# Patient Record
Sex: FEMALE | Employment: UNEMPLOYED | URBAN - METROPOLITAN AREA
[De-identification: names, ages, dates, MRNs, and addresses within clinical notes are randomized per-mention and may not be internally consistent; named-entity substitution may affect disease eponyms.]

---

## 2021-07-24 ENCOUNTER — APPOINTMENT (OUTPATIENT)
Dept: GENERAL RADIOLOGY | Facility: CLINIC | Age: 61
End: 2021-07-24
Attending: INTERNAL MEDICINE
Payer: MEDICAID

## 2021-07-24 ENCOUNTER — HOSPITAL ENCOUNTER (INPATIENT)
Facility: CLINIC | Age: 61
LOS: 2 days | End: 2021-07-26
Attending: INTERNAL MEDICINE | Admitting: INTERNAL MEDICINE
Payer: MEDICAID

## 2021-07-24 ENCOUNTER — APPOINTMENT (OUTPATIENT)
Dept: CT IMAGING | Facility: CLINIC | Age: 61
End: 2021-07-24
Attending: INTERNAL MEDICINE
Payer: MEDICAID

## 2021-07-24 DIAGNOSIS — I46.9 CARDIAC ARREST (H): Primary | ICD-10-CM

## 2021-07-24 LAB
ACT BLD: 154 SECONDS (ref 74–150)
ACT BLD: 163 SECONDS (ref 74–150)
ACT BLD: 171 SECONDS (ref 74–150)
ACT BLD: 171 SECONDS (ref 74–150)
ACT BLD: 175 SECONDS (ref 74–150)
ACT BLD: 324 SECONDS (ref 74–150)
ALBUMIN SERPL-MCNC: 2.2 G/DL (ref 3.4–5)
ALBUMIN UR-MCNC: 100 MG/DL
ALP SERPL-CCNC: 131 U/L (ref 40–150)
ALT SERPL W P-5'-P-CCNC: 101 U/L (ref 0–50)
AMPHETAMINES UR QL SCN: ABNORMAL
ANION GAP SERPL CALCULATED.3IONS-SCNC: 14 MMOL/L (ref 3–14)
ANION GAP SERPL CALCULATED.3IONS-SCNC: 17 MMOL/L (ref 3–14)
ANTIBODY SCREEN: NEGATIVE
ANTIBODY SCREEN: NEGATIVE
APPEARANCE UR: CLEAR
APTT PPP: 171 SECONDS (ref 22–38)
APTT PPP: >240 SECONDS (ref 22–38)
AST SERPL W P-5'-P-CCNC: 313 U/L (ref 0–45)
BARBITURATES UR QL: ABNORMAL
BASE EXCESS BLDA CALC-SCNC: -12.1 MMOL/L (ref -9.6–2)
BASE EXCESS BLDA CALC-SCNC: -12.7 MMOL/L (ref -9–1.8)
BASE EXCESS BLDA CALC-SCNC: -13.1 MMOL/L (ref -9–1.8)
BASE EXCESS BLDA CALC-SCNC: -13.5 MMOL/L (ref -9–1.8)
BASE EXCESS BLDA CALC-SCNC: -14 MMOL/L (ref -9–1.8)
BASE EXCESS BLDA CALC-SCNC: -14.3 MMOL/L (ref -9–1.8)
BASE EXCESS BLDA CALC-SCNC: -18.1 MMOL/L (ref -9.6–2)
BASE EXCESS BLDA CALC-SCNC: -19.5 MMOL/L (ref -9.6–2)
BASE EXCESS BLDA CALC-SCNC: -8.1 MMOL/L (ref -9.6–2)
BASE EXCESS BLDV CALC-SCNC: -11.7 MMOL/L (ref -7.7–1.9)
BASOPHILS # BLD AUTO: 0.1 10E3/UL (ref 0–0.2)
BASOPHILS NFR BLD AUTO: 0 %
BENZODIAZ UR QL: ABNORMAL
BILIRUB SERPL-MCNC: 0.4 MG/DL (ref 0.2–1.3)
BILIRUB UR QL STRIP: NEGATIVE
BLD PROD TYP BPU: NORMAL
BLOOD COMPONENT TYPE: NORMAL
BUN SERPL-MCNC: 28 MG/DL (ref 7–30)
BUN SERPL-MCNC: 28 MG/DL (ref 7–30)
CA-I BLD-MCNC: 3.6 MG/DL (ref 4.4–5.2)
CA-I BLD-MCNC: 3.7 MG/DL (ref 4.4–5.2)
CA-I BLD-MCNC: 3.9 MG/DL (ref 4.4–5.2)
CA-I BLD-MCNC: 4.1 MG/DL (ref 4.4–5.2)
CA-I BLD-MCNC: 4.2 MG/DL (ref 4.4–5.2)
CA-I BLD-MCNC: 4.6 MG/DL (ref 4.4–5.2)
CA-I BLD-MCNC: 4.6 MG/DL (ref 4.4–5.2)
CALCIUM SERPL-MCNC: 6 MG/DL (ref 8.5–10.1)
CALCIUM SERPL-MCNC: 7 MG/DL (ref 8.5–10.1)
CANNABINOIDS UR QL SCN: ABNORMAL
CF REDUC 30M P MA P HEP LENFR BLD TEG: 0 % (ref 0–8)
CF REDUC 60M P MA P HEPASE LENFR BLD TEG: 0.5 % (ref 0–15)
CFT P HPASE BLD TEG: 1.5 MINUTE (ref 1–3)
CHLORIDE BLD-SCNC: 116 MMOL/L (ref 94–109)
CHLORIDE BLD-SCNC: 118 MMOL/L (ref 94–109)
CI (COAGULATION INDEX)(Z): -0.1 (ref -3–3)
CLOT ANGLE P HPASE BLD TEG: 67 DEGREES (ref 53–72)
CLOT INIT P HPASE BLD TEG: 6.2 MINUTE (ref 5–10)
CLOT STRENGTH P HPASE BLD TEG: 7.2 KD/SC (ref 4.5–11)
CO2 SERPL-SCNC: 15 MMOL/L (ref 20–32)
CO2 SERPL-SCNC: 16 MMOL/L (ref 20–32)
COCAINE UR QL: ABNORMAL
CODING SYSTEM: NORMAL
COLOR UR AUTO: ABNORMAL
CORTIS SERPL-MCNC: 21.6 UG/DL (ref 4–22)
CREAT SERPL-MCNC: 1.03 MG/DL (ref 0.52–1.04)
CREAT SERPL-MCNC: 1.17 MG/DL (ref 0.52–1.04)
CRP SERPL-MCNC: <2.9 MG/L (ref 0–8)
D DIMER PPP FEU-MCNC: 15.67 UG/ML FEU (ref 0–0.5)
EOSINOPHIL # BLD AUTO: 0.1 10E3/UL (ref 0–0.7)
EOSINOPHIL NFR BLD AUTO: 0 %
ERYTHROCYTE [DISTWIDTH] IN BLOOD BY AUTOMATED COUNT: 17.2 % (ref 10–15)
ERYTHROCYTE [DISTWIDTH] IN BLOOD BY AUTOMATED COUNT: 20 % (ref 10–15)
ERYTHROCYTE [DISTWIDTH] IN BLOOD BY AUTOMATED COUNT: 20 % (ref 10–15)
ERYTHROCYTE [SEDIMENTATION RATE] IN BLOOD BY WESTERGREN METHOD: 8 MM/HR (ref 0–30)
ETHANOL UR QL SCN: ABNORMAL
FIBRINOGEN PPP-MCNC: 138 MG/DL (ref 170–490)
GFR SERPL CREATININE-BSD FRML MDRD: 33 ML/MIN/1.73M2
GFR SERPL CREATININE-BSD FRML MDRD: 58 ML/MIN/1.73M2
GLUCOSE BLD-MCNC: 109 MG/DL (ref 70–99)
GLUCOSE BLD-MCNC: 111 MG/DL (ref 70–99)
GLUCOSE BLD-MCNC: 171 MG/DL (ref 70–99)
GLUCOSE BLD-MCNC: 175 MG/DL (ref 70–99)
GLUCOSE BLD-MCNC: 256 MG/DL (ref 70–99)
GLUCOSE BLD-MCNC: 287 MG/DL (ref 70–99)
GLUCOSE BLD-MCNC: 339 MG/DL (ref 70–99)
GLUCOSE BLD-MCNC: 400 MG/DL (ref 70–99)
GLUCOSE BLDC GLUCOMTR-MCNC: 120 MG/DL (ref 70–99)
GLUCOSE BLDC GLUCOMTR-MCNC: 158 MG/DL (ref 70–99)
GLUCOSE UR STRIP-MCNC: 100 MG/DL
HCO3 BLD-SCNC: 13 MMOL/L (ref 21–28)
HCO3 BLD-SCNC: 13 MMOL/L (ref 21–28)
HCO3 BLD-SCNC: 14 MMOL/L (ref 21–28)
HCO3 BLD-SCNC: 14 MMOL/L (ref 21–28)
HCO3 BLDA-SCNC: 10 MMOL/L (ref 21–28)
HCO3 BLDA-SCNC: 14 MMOL/L (ref 21–28)
HCO3 BLDA-SCNC: 14 MMOL/L (ref 21–28)
HCO3 BLDA-SCNC: 15 MMOL/L (ref 21–28)
HCO3 BLDA-SCNC: 18 MMOL/L (ref 21–28)
HCO3 BLDV-SCNC: 17 MMOL/L (ref 21–28)
HCT VFR BLD AUTO: 20.9 % (ref 35–47)
HCT VFR BLD AUTO: 24.9 % (ref 35–47)
HCT VFR BLD AUTO: 24.9 % (ref 35–47)
HGB BLD-MCNC: 6.1 G/DL (ref 13.3–17.7)
HGB BLD-MCNC: 6.5 G/DL (ref 11.7–15.7)
HGB BLD-MCNC: 6.8 G/DL (ref 13.3–17.7)
HGB BLD-MCNC: 7.3 G/DL (ref 11.7–15.7)
HGB BLD-MCNC: 7.3 G/DL (ref 11.7–15.7)
HGB BLD-MCNC: 7.7 G/DL (ref 13.3–17.7)
HGB BLD-MCNC: 8 G/DL (ref 13.3–17.7)
HGB FREE PLAS-MCNC: 40 MG/DL
HGB UR QL STRIP: ABNORMAL
HOLD SPECIMEN: NORMAL
HYALINE CASTS: 3 /LPF
IMM GRANULOCYTES # BLD: 0.5 10E3/UL
IMM GRANULOCYTES NFR BLD: 2 %
INR PPP: 1.66 (ref 0.85–1.15)
INR PPP: 3.99 (ref 0.85–1.15)
ISSUE DATE AND TIME: NORMAL
KETONES UR STRIP-MCNC: NEGATIVE MG/DL
LACTATE BLD-SCNC: 10.3 MMOL/L
LACTATE BLD-SCNC: 10.9 MMOL/L
LACTATE BLD-SCNC: 11.7 MMOL/L
LACTATE BLD-SCNC: 9.9 MMOL/L
LACTATE SERPL-SCNC: 10.4 MMOL/L (ref 0.7–2)
LACTATE SERPL-SCNC: 12.4 MMOL/L (ref 0.7–2)
LACTATE SERPL-SCNC: 12.7 MMOL/L (ref 0.7–2)
LACTATE SERPL-SCNC: 7.9 MMOL/L (ref 0.7–2)
LDH SERPL L TO P-CCNC: 534 U/L (ref 81–234)
LEUKOCYTE ESTERASE UR QL STRIP: NEGATIVE
LYMPHOCYTES # BLD AUTO: 3.2 10E3/UL (ref 0.8–5.3)
LYMPHOCYTES NFR BLD AUTO: 12 %
MAGNESIUM SERPL-MCNC: 1.5 MG/DL (ref 1.6–2.3)
MAGNESIUM SERPL-MCNC: 1.6 MG/DL (ref 1.6–2.3)
MAGNESIUM SERPL-MCNC: 2.1 MG/DL (ref 1.6–2.3)
MCF P HPASE BLD TEG: 58.9 MM (ref 50–70)
MCH RBC QN AUTO: 22.5 PG (ref 26.5–33)
MCH RBC QN AUTO: 22.5 PG (ref 26.5–33)
MCH RBC QN AUTO: 26.7 PG (ref 26.5–33)
MCHC RBC AUTO-ENTMCNC: 29.3 G/DL (ref 31.5–36.5)
MCHC RBC AUTO-ENTMCNC: 29.3 G/DL (ref 31.5–36.5)
MCHC RBC AUTO-ENTMCNC: 31.1 G/DL (ref 31.5–36.5)
MCV RBC AUTO: 77 FL (ref 78–100)
MCV RBC AUTO: 77 FL (ref 78–100)
MCV RBC AUTO: 86 FL (ref 78–100)
MONOCYTES # BLD AUTO: 0.4 10E3/UL (ref 0–1.3)
MONOCYTES NFR BLD AUTO: 2 %
MUCOUS THREADS #/AREA URNS LPF: PRESENT /LPF
NEUTROPHILS # BLD AUTO: 22.5 10E3/UL (ref 1.6–8.3)
NEUTROPHILS NFR BLD AUTO: 84 %
NITRATE UR QL: NEGATIVE
NRBC # BLD AUTO: 0 10E3/UL
NRBC BLD AUTO-RTO: 0 /100
O2/TOTAL GAS SETTING VFR VENT: 100 %
O2/TOTAL GAS SETTING VFR VENT: 100 %
O2/TOTAL GAS SETTING VFR VENT: 40 %
O2/TOTAL GAS SETTING VFR VENT: 60 %
OPIATES UR QL SCN: ABNORMAL
OXYHGB MFR BLD: 96 % (ref 92–100)
OXYHGB MFR BLD: 97 % (ref 92–100)
OXYHGB MFR BLD: 98 % (ref 92–100)
OXYHGB MFR BLD: 98 % (ref 92–100)
OXYHGB MFR BLDA: 90 % (ref 92–100)
OXYHGB MFR BLDA: 96 % (ref 92–100)
OXYHGB MFR BLDA: 97 % (ref 92–100)
OXYHGB MFR BLDA: 98 % (ref 75–100)
OXYHGB MFR BLDA: 98 % (ref 92–100)
OXYHGB MFR BLDV: 57 %
PCO2 BLD: 32 MM HG (ref 35–45)
PCO2 BLD: 35 MM HG (ref 35–45)
PCO2 BLD: 39 MM HG (ref 35–45)
PCO2 BLD: 40 MM HG (ref 35–45)
PCO2 BLDA: 33 MM HG (ref 35–45)
PCO2 BLDA: 37 MM HG (ref 35–45)
PCO2 BLDA: 37 MM HG (ref 35–45)
PCO2 BLDA: 38 MM HG (ref 35–45)
PCO2 BLDA: 76 MM HG (ref 35–45)
PCO2 BLDV: 49 MM HG (ref 40–50)
PH BLD: 7.14 [PH] (ref 7.35–7.45)
PH BLD: 7.17 [PH] (ref 7.35–7.45)
PH BLD: 7.19 [PH] (ref 7.35–7.45)
PH BLD: 7.22 [PH] (ref 7.35–7.45)
PH BLDA: 6.88 [PH] (ref 7.35–7.45)
PH BLDA: 7.03 [PH] (ref 7.35–7.45)
PH BLDA: 7.19 [PH] (ref 7.35–7.45)
PH BLDA: 7.24 [PH] (ref 7.35–7.45)
PH BLDA: 7.29 [PH] (ref 7.35–7.45)
PH BLDV: 7.13 [PH] (ref 7.32–7.43)
PH UR STRIP: 6.5 [PH] (ref 5–7)
PLATELET # BLD AUTO: 203 10E3/UL (ref 150–450)
PLATELET # BLD AUTO: 203 10E3/UL (ref 150–450)
PLATELET # BLD AUTO: 60 10E3/UL (ref 150–450)
PO2 BLD: 144 MM HG (ref 80–105)
PO2 BLD: 150 MM HG (ref 80–105)
PO2 BLD: 478 MM HG (ref 80–105)
PO2 BLD: 95 MM HG (ref 80–105)
PO2 BLDA: 102 MM HG (ref 80–105)
PO2 BLDA: 203 MM HG (ref 80–105)
PO2 BLDA: 221 MM HG (ref 80–105)
PO2 BLDA: 380 MM HG (ref 80–105)
PO2 BLDA: 526 MM HG (ref 80–105)
PO2 BLDV: 39 MM HG (ref 25–47)
POTASSIUM BLD-SCNC: 3.6 MMOL/L (ref 3.4–5.3)
POTASSIUM BLD-SCNC: 4.2 MMOL/L (ref 3.4–5.3)
POTASSIUM BLD-SCNC: 4.2 MMOL/L (ref 3.4–5.3)
POTASSIUM BLD-SCNC: 4.2 MMOL/L (ref 3.5–5)
POTASSIUM BLD-SCNC: 4.3 MMOL/L (ref 3.5–5)
POTASSIUM BLD-SCNC: 4.7 MMOL/L (ref 3.5–5)
POTASSIUM BLD-SCNC: 4.9 MMOL/L (ref 3.5–5)
PROCALCITONIN SERPL-MCNC: 0.14 NG/ML
PROT SERPL-MCNC: 4 G/DL (ref 6.8–8.8)
RBC # BLD AUTO: 2.43 10E6/UL (ref 3.8–5.2)
RBC # BLD AUTO: 3.24 10E6/UL (ref 3.8–5.2)
RBC # BLD AUTO: 3.24 10E6/UL (ref 3.8–5.2)
RBC URINE: 44 /HPF
SARS-COV-2 RNA RESP QL NAA+PROBE: NEGATIVE
SODIUM BLD-SCNC: 138 MMOL/L (ref 133–144)
SODIUM BLD-SCNC: 139 MMOL/L (ref 133–144)
SODIUM SERPL-SCNC: 146 MMOL/L (ref 133–144)
SODIUM SERPL-SCNC: 150 MMOL/L (ref 133–144)
SP GR UR STRIP: 1.02 (ref 1–1.03)
SPECIMEN EXPIRATION DATE: NORMAL
SPECIMEN EXPIRATION DATE: NORMAL
SQUAMOUS EPITHELIAL: <1 /HPF
TROPONIN I SERPL-MCNC: 13.68 UG/L (ref 0–0.04)
UFH PPP CHRO-ACNC: <0.1 IU/ML
UFH PPP CHRO-ACNC: >1.1 IU/ML
UNIT ABO/RH: NORMAL
UNIT NUMBER: NORMAL
UNIT STATUS: NORMAL
UNIT TYPE ISBT: 8400
UNIT TYPE ISBT: 9500
UROBILINOGEN UR STRIP-MCNC: NORMAL MG/DL
WBC # BLD AUTO: 13.5 10E3/UL (ref 4–11)
WBC # BLD AUTO: 26.8 10E3/UL (ref 4–11)
WBC # BLD AUTO: 26.8 10E3/UL (ref 4–11)
WBC URINE: 0 /HPF

## 2021-07-24 PROCEDURE — 3E043XZ INTRODUCTION OF VASOPRESSOR INTO CENTRAL VEIN, PERCUTANEOUS APPROACH: ICD-10-PCS | Performed by: INTERNAL MEDICINE

## 2021-07-24 PROCEDURE — 250N000011 HC RX IP 250 OP 636: Performed by: INTERNAL MEDICINE

## 2021-07-24 PROCEDURE — 80365 DRUG SCREENING OXYCODONE: CPT | Performed by: INTERNAL MEDICINE

## 2021-07-24 PROCEDURE — C1725 CATH, TRANSLUMIN NON-LASER: HCPCS | Performed by: INTERNAL MEDICINE

## 2021-07-24 PROCEDURE — 85396 CLOTTING ASSAY WHOLE BLOOD: CPT | Performed by: INTERNAL MEDICINE

## 2021-07-24 PROCEDURE — C1874 STENT, COATED/COV W/DEL SYS: HCPCS | Performed by: INTERNAL MEDICINE

## 2021-07-24 PROCEDURE — 36556 INSERT NON-TUNNEL CV CATH: CPT | Performed by: INTERNAL MEDICINE

## 2021-07-24 PROCEDURE — 82803 BLOOD GASES ANY COMBINATION: CPT

## 2021-07-24 PROCEDURE — 5A1522F EXTRACORPOREAL OXYGENATION, MEMBRANE, CENTRAL: ICD-10-PCS | Performed by: INTERNAL MEDICINE

## 2021-07-24 PROCEDURE — 272N000001 HC OR GENERAL SUPPLY STERILE: Performed by: INTERNAL MEDICINE

## 2021-07-24 PROCEDURE — 83735 ASSAY OF MAGNESIUM: CPT | Performed by: INTERNAL MEDICINE

## 2021-07-24 PROCEDURE — 250N000009 HC RX 250: Performed by: INTERNAL MEDICINE

## 2021-07-24 PROCEDURE — 85610 PROTHROMBIN TIME: CPT | Performed by: INTERNAL MEDICINE

## 2021-07-24 PROCEDURE — 99153 MOD SED SAME PHYS/QHP EA: CPT | Performed by: INTERNAL MEDICINE

## 2021-07-24 PROCEDURE — 200N000002 HC R&B ICU UMMC

## 2021-07-24 PROCEDURE — B2111ZZ FLUOROSCOPY OF MULTIPLE CORONARY ARTERIES USING LOW OSMOLAR CONTRAST: ICD-10-PCS | Performed by: INTERNAL MEDICINE

## 2021-07-24 PROCEDURE — 82330 ASSAY OF CALCIUM: CPT

## 2021-07-24 PROCEDURE — 86920 COMPATIBILITY TEST SPIN: CPT | Performed by: INTERNAL MEDICINE

## 2021-07-24 PROCEDURE — 85347 COAGULATION TIME ACTIVATED: CPT

## 2021-07-24 PROCEDURE — 84132 ASSAY OF SERUM POTASSIUM: CPT | Performed by: INTERNAL MEDICINE

## 2021-07-24 PROCEDURE — 80307 DRUG TEST PRSMV CHEM ANLYZR: CPT | Performed by: INTERNAL MEDICINE

## 2021-07-24 PROCEDURE — 71250 CT THORAX DX C-: CPT

## 2021-07-24 PROCEDURE — P9016 RBC LEUKOCYTES REDUCED: HCPCS

## 2021-07-24 PROCEDURE — 82805 BLOOD GASES W/O2 SATURATION: CPT | Performed by: INTERNAL MEDICINE

## 2021-07-24 PROCEDURE — 85384 FIBRINOGEN ACTIVITY: CPT | Performed by: INTERNAL MEDICINE

## 2021-07-24 PROCEDURE — 84295 ASSAY OF SERUM SODIUM: CPT | Performed by: INTERNAL MEDICINE

## 2021-07-24 PROCEDURE — 85520 HEPARIN ASSAY: CPT | Performed by: INTERNAL MEDICINE

## 2021-07-24 PROCEDURE — 999N000127 HC STATISTIC PERIPHERAL IV START W US GUIDANCE

## 2021-07-24 PROCEDURE — 33947 ECMO/ECLS INITIATION ARTERY: CPT

## 2021-07-24 PROCEDURE — 86900 BLOOD TYPING SEROLOGIC ABO: CPT | Performed by: INTERNAL MEDICINE

## 2021-07-24 PROCEDURE — 82803 BLOOD GASES ANY COMBINATION: CPT | Performed by: INTERNAL MEDICINE

## 2021-07-24 PROCEDURE — 83605 ASSAY OF LACTIC ACID: CPT | Performed by: INTERNAL MEDICINE

## 2021-07-24 PROCEDURE — C1751 CATH, INF, PER/CENT/MIDLINE: HCPCS | Performed by: INTERNAL MEDICINE

## 2021-07-24 PROCEDURE — 83051 HEMOGLOBIN PLASMA: CPT | Performed by: INTERNAL MEDICINE

## 2021-07-24 PROCEDURE — 84155 ASSAY OF PROTEIN SERUM: CPT | Performed by: INTERNAL MEDICINE

## 2021-07-24 PROCEDURE — 250N000013 HC RX MED GY IP 250 OP 250 PS 637: Performed by: INTERNAL MEDICINE

## 2021-07-24 PROCEDURE — C1769 GUIDE WIRE: HCPCS | Performed by: INTERNAL MEDICINE

## 2021-07-24 PROCEDURE — 81001 URINALYSIS AUTO W/SCOPE: CPT | Performed by: INTERNAL MEDICINE

## 2021-07-24 PROCEDURE — 6A4Z0ZZ HYPOTHERMIA, SINGLE: ICD-10-PCS | Performed by: INTERNAL MEDICINE

## 2021-07-24 PROCEDURE — 94002 VENT MGMT INPAT INIT DAY: CPT

## 2021-07-24 PROCEDURE — 5A1945Z RESPIRATORY VENTILATION, 24-96 CONSECUTIVE HOURS: ICD-10-PCS | Performed by: INTERNAL MEDICINE

## 2021-07-24 PROCEDURE — 999N000157 HC STATISTIC RCP TIME EA 10 MIN

## 2021-07-24 PROCEDURE — 71045 X-RAY EXAM CHEST 1 VIEW: CPT | Mod: 26 | Performed by: RADIOLOGY

## 2021-07-24 PROCEDURE — 70450 CT HEAD/BRAIN W/O DYE: CPT | Mod: 26 | Performed by: RADIOLOGY

## 2021-07-24 PROCEDURE — 86140 C-REACTIVE PROTEIN: CPT | Performed by: INTERNAL MEDICINE

## 2021-07-24 PROCEDURE — 86316 IMMUNOASSAY TUMOR OTHER: CPT | Performed by: INTERNAL MEDICINE

## 2021-07-24 PROCEDURE — 99291 CRITICAL CARE FIRST HOUR: CPT | Mod: 25 | Performed by: INTERNAL MEDICINE

## 2021-07-24 PROCEDURE — 85379 FIBRIN DEGRADATION QUANT: CPT | Performed by: INTERNAL MEDICINE

## 2021-07-24 PROCEDURE — 272N000555 HC SENSOR NIRS OXIMETER, ADULT

## 2021-07-24 PROCEDURE — 84450 TRANSFERASE (AST) (SGOT): CPT | Performed by: INTERNAL MEDICINE

## 2021-07-24 PROCEDURE — 027034Z DILATION OF CORONARY ARTERY, ONE ARTERY WITH DRUG-ELUTING INTRALUMINAL DEVICE, PERCUTANEOUS APPROACH: ICD-10-PCS | Performed by: INTERNAL MEDICINE

## 2021-07-24 PROCEDURE — C1887 CATHETER, GUIDING: HCPCS | Performed by: INTERNAL MEDICINE

## 2021-07-24 PROCEDURE — 85025 COMPLETE CBC W/AUTO DIFF WBC: CPT | Performed by: INTERNAL MEDICINE

## 2021-07-24 PROCEDURE — 82330 ASSAY OF CALCIUM: CPT | Performed by: INTERNAL MEDICINE

## 2021-07-24 PROCEDURE — 272N000237 HC CARDIOHELP CIRCUIT

## 2021-07-24 PROCEDURE — 80361 OPIATES 1 OR MORE: CPT | Performed by: INTERNAL MEDICINE

## 2021-07-24 PROCEDURE — 250N000009 HC RX 250

## 2021-07-24 PROCEDURE — 71045 X-RAY EXAM CHEST 1 VIEW: CPT

## 2021-07-24 PROCEDURE — P9059 PLASMA, FRZ BETWEEN 8-24HOUR: HCPCS

## 2021-07-24 PROCEDURE — 82947 ASSAY GLUCOSE BLOOD QUANT: CPT | Performed by: INTERNAL MEDICINE

## 2021-07-24 PROCEDURE — 258N000003 HC RX IP 258 OP 636: Performed by: INTERNAL MEDICINE

## 2021-07-24 PROCEDURE — C9606 PERC D-E COR REVASC W AMI S: HCPCS | Performed by: INTERNAL MEDICINE

## 2021-07-24 PROCEDURE — C9113 INJ PANTOPRAZOLE SODIUM, VIA: HCPCS | Performed by: INTERNAL MEDICINE

## 2021-07-24 PROCEDURE — 99152 MOD SED SAME PHYS/QHP 5/>YRS: CPT | Performed by: INTERNAL MEDICINE

## 2021-07-24 PROCEDURE — U0003 INFECTIOUS AGENT DETECTION BY NUCLEIC ACID (DNA OR RNA); SEVERE ACUTE RESPIRATORY SYNDROME CORONAVIRUS 2 (SARS-COV-2) (CORONAVIRUS DISEASE [COVID-19]), AMPLIFIED PROBE TECHNIQUE, MAKING USE OF HIGH THROUGHPUT TECHNOLOGIES AS DESCRIBED BY CMS-2020-01-R: HCPCS | Performed by: INTERNAL MEDICINE

## 2021-07-24 PROCEDURE — 74176 CT ABD & PELVIS W/O CONTRAST: CPT | Mod: 26 | Performed by: RADIOLOGY

## 2021-07-24 PROCEDURE — 82533 TOTAL CORTISOL: CPT | Performed by: INTERNAL MEDICINE

## 2021-07-24 PROCEDURE — 70450 CT HEAD/BRAIN W/O DYE: CPT

## 2021-07-24 PROCEDURE — 85027 COMPLETE CBC AUTOMATED: CPT | Performed by: INTERNAL MEDICINE

## 2021-07-24 PROCEDURE — 999N000185 HC STATISTIC TRANSPORT TIME EA 15 MIN

## 2021-07-24 PROCEDURE — 83615 LACTATE (LD) (LDH) ENZYME: CPT | Performed by: INTERNAL MEDICINE

## 2021-07-24 PROCEDURE — 85652 RBC SED RATE AUTOMATED: CPT | Performed by: INTERNAL MEDICINE

## 2021-07-24 PROCEDURE — 272N000203 HC ARTERIAL CANNULA 15-17 FRENCH: Performed by: INTERNAL MEDICINE

## 2021-07-24 PROCEDURE — 80359 METHYLENEDIOXYAMPHETAMINES: CPT | Performed by: INTERNAL MEDICINE

## 2021-07-24 PROCEDURE — 80347 BENZODIAZEPINES 13 OR MORE: CPT | Performed by: INTERNAL MEDICINE

## 2021-07-24 PROCEDURE — 33952 ECMO/ECLS INSJ PRPH CANNULA: CPT | Performed by: INTERNAL MEDICINE

## 2021-07-24 PROCEDURE — 84484 ASSAY OF TROPONIN QUANT: CPT | Performed by: INTERNAL MEDICINE

## 2021-07-24 PROCEDURE — P9041 ALBUMIN (HUMAN),5%, 50ML: HCPCS | Performed by: INTERNAL MEDICINE

## 2021-07-24 PROCEDURE — C9460 INJECTION, CANGRELOR: HCPCS | Performed by: INTERNAL MEDICINE

## 2021-07-24 PROCEDURE — 272N000002 HC OR SUPPLY OTHER OPNP: Performed by: INTERNAL MEDICINE

## 2021-07-24 PROCEDURE — C1894 INTRO/SHEATH, NON-LASER: HCPCS | Performed by: INTERNAL MEDICINE

## 2021-07-24 PROCEDURE — 71250 CT THORAX DX C-: CPT | Mod: 26 | Performed by: RADIOLOGY

## 2021-07-24 PROCEDURE — 85730 THROMBOPLASTIN TIME PARTIAL: CPT | Performed by: INTERNAL MEDICINE

## 2021-07-24 PROCEDURE — C1757 CATH, THROMBECTOMY/EMBOLECT: HCPCS | Performed by: INTERNAL MEDICINE

## 2021-07-24 PROCEDURE — 82810 BLOOD GASES O2 SAT ONLY: CPT

## 2021-07-24 PROCEDURE — 999N000065 XR CHEST PORT 1 VIEW

## 2021-07-24 PROCEDURE — 84145 PROCALCITONIN (PCT): CPT | Performed by: INTERNAL MEDICINE

## 2021-07-24 PROCEDURE — 93454 CORONARY ARTERY ANGIO S&I: CPT | Performed by: INTERNAL MEDICINE

## 2021-07-24 DEVICE — STENT SYNERGY DRUG ELUTING 3.50X32MM  H7493926032350: Type: IMPLANTABLE DEVICE | Status: FUNCTIONAL

## 2021-07-24 DEVICE — STENT SYNERGY DRUG ELUTING 3.00X38MM  H7493926038300: Type: IMPLANTABLE DEVICE | Status: FUNCTIONAL

## 2021-07-24 RX ORDER — HEPARIN SODIUM 10000 [USP'U]/100ML
10-50 INJECTION, SOLUTION INTRAVENOUS CONTINUOUS
Status: DISCONTINUED | OUTPATIENT
Start: 2021-07-24 | End: 2021-07-26 | Stop reason: HOSPADM

## 2021-07-24 RX ORDER — HEPARIN SODIUM 200 [USP'U]/100ML
3 INJECTION, SOLUTION INTRAVENOUS CONTINUOUS
Status: DISCONTINUED | OUTPATIENT
Start: 2021-07-24 | End: 2021-07-26 | Stop reason: HOSPADM

## 2021-07-24 RX ORDER — NALOXONE HYDROCHLORIDE 0.4 MG/ML
0.4 INJECTION, SOLUTION INTRAMUSCULAR; INTRAVENOUS; SUBCUTANEOUS
Status: DISCONTINUED | OUTPATIENT
Start: 2021-07-24 | End: 2021-07-26 | Stop reason: HOSPADM

## 2021-07-24 RX ORDER — HEPARIN SODIUM 10000 [USP'U]/100ML
10-50 INJECTION, SOLUTION INTRAVENOUS CONTINUOUS
Status: DISCONTINUED | OUTPATIENT
Start: 2021-07-24 | End: 2021-07-24

## 2021-07-24 RX ORDER — POTASSIUM CHLORIDE 29.8 MG/ML
20 INJECTION INTRAVENOUS
Status: DISCONTINUED | OUTPATIENT
Start: 2021-07-24 | End: 2021-07-26 | Stop reason: HOSPADM

## 2021-07-24 RX ORDER — MAGNESIUM SULFATE HEPTAHYDRATE 40 MG/ML
4 INJECTION, SOLUTION INTRAVENOUS EVERY 4 HOURS PRN
Status: DISCONTINUED | OUTPATIENT
Start: 2021-07-24 | End: 2021-07-26 | Stop reason: HOSPADM

## 2021-07-24 RX ORDER — ASPIRIN 325 MG
TABLET ORAL
Status: DISCONTINUED | OUTPATIENT
Start: 2021-07-24 | End: 2021-07-24 | Stop reason: HOSPADM

## 2021-07-24 RX ORDER — PHENYLEPHRINE HCL IN 0.9% NACL 50MG/250ML
.5-6 PLASTIC BAG, INJECTION (ML) INTRAVENOUS CONTINUOUS
Status: DISCONTINUED | OUTPATIENT
Start: 2021-07-24 | End: 2021-07-26

## 2021-07-24 RX ORDER — EPINEPHRINE IN SOD CHLOR,ISO 1 MG/10 ML
SYRINGE (ML) INTRAVENOUS
Status: DISCONTINUED | OUTPATIENT
Start: 2021-07-24 | End: 2021-07-24 | Stop reason: HOSPADM

## 2021-07-24 RX ORDER — LIDOCAINE 40 MG/G
CREAM TOPICAL
Status: DISCONTINUED | OUTPATIENT
Start: 2021-07-24 | End: 2021-07-26 | Stop reason: HOSPADM

## 2021-07-24 RX ORDER — VASOPRESSIN 20 U/ML
INJECTION PARENTERAL
Status: DISCONTINUED | OUTPATIENT
Start: 2021-07-24 | End: 2021-07-24 | Stop reason: HOSPADM

## 2021-07-24 RX ORDER — HEPARIN SODIUM 1000 [USP'U]/ML
INJECTION, SOLUTION INTRAVENOUS; SUBCUTANEOUS
Status: DISCONTINUED | OUTPATIENT
Start: 2021-07-24 | End: 2021-07-24 | Stop reason: HOSPADM

## 2021-07-24 RX ORDER — ALBUMIN, HUMAN INJ 5% 5 %
1000 SOLUTION INTRAVENOUS ONCE
Status: COMPLETED | OUTPATIENT
Start: 2021-07-24 | End: 2021-07-24

## 2021-07-24 RX ORDER — NALOXONE HYDROCHLORIDE 0.4 MG/ML
0.2 INJECTION, SOLUTION INTRAMUSCULAR; INTRAVENOUS; SUBCUTANEOUS
Status: DISCONTINUED | OUTPATIENT
Start: 2021-07-24 | End: 2021-07-26 | Stop reason: HOSPADM

## 2021-07-24 RX ORDER — ASPIRIN 81 MG/1
81 TABLET, CHEWABLE ORAL DAILY
Status: DISCONTINUED | OUTPATIENT
Start: 2021-07-25 | End: 2021-07-26 | Stop reason: HOSPADM

## 2021-07-24 RX ORDER — NOREPINEPHRINE BITARTRATE 0.06 MG/ML
INJECTION, SOLUTION INTRAVENOUS CONTINUOUS PRN
Status: COMPLETED | OUTPATIENT
Start: 2021-07-24 | End: 2021-07-24

## 2021-07-24 RX ORDER — MAGNESIUM SULFATE HEPTAHYDRATE 40 MG/ML
2 INJECTION, SOLUTION INTRAVENOUS DAILY PRN
Status: DISCONTINUED | OUTPATIENT
Start: 2021-07-24 | End: 2021-07-26 | Stop reason: HOSPADM

## 2021-07-24 RX ORDER — ALBUMIN, HUMAN INJ 5% 5 %
SOLUTION INTRAVENOUS
Status: DISCONTINUED | OUTPATIENT
Start: 2021-07-24 | End: 2021-07-24 | Stop reason: HOSPADM

## 2021-07-24 RX ORDER — NOREPINEPHRINE BITARTRATE 0.06 MG/ML
.01-.6 INJECTION, SOLUTION INTRAVENOUS CONTINUOUS
Status: DISCONTINUED | OUTPATIENT
Start: 2021-07-24 | End: 2021-07-26 | Stop reason: HOSPADM

## 2021-07-24 RX ORDER — MIDAZOLAM HCL IN 0.9 % NACL/PF 1 MG/ML
1-8 PLASTIC BAG, INJECTION (ML) INTRAVENOUS CONTINUOUS
Status: DISCONTINUED | OUTPATIENT
Start: 2021-07-24 | End: 2021-07-26 | Stop reason: HOSPADM

## 2021-07-24 RX ORDER — MIDAZOLAM HCL IN 0.9 % NACL/PF 1 MG/ML
PLASTIC BAG, INJECTION (ML) INTRAVENOUS CONTINUOUS PRN
Status: COMPLETED | OUTPATIENT
Start: 2021-07-24 | End: 2021-07-24

## 2021-07-24 RX ORDER — PIPERACILLIN SODIUM, TAZOBACTAM SODIUM 3; .375 G/15ML; G/15ML
3.38 INJECTION, POWDER, LYOPHILIZED, FOR SOLUTION INTRAVENOUS EVERY 6 HOURS
Status: DISCONTINUED | OUTPATIENT
Start: 2021-07-24 | End: 2021-07-26 | Stop reason: HOSPADM

## 2021-07-24 RX ORDER — PROPOFOL 10 MG/ML
5-75 INJECTION, EMULSION INTRAVENOUS CONTINUOUS
Status: DISCONTINUED | OUTPATIENT
Start: 2021-07-24 | End: 2021-07-26

## 2021-07-24 RX ORDER — IOPAMIDOL 755 MG/ML
INJECTION, SOLUTION INTRAVASCULAR
Status: DISCONTINUED | OUTPATIENT
Start: 2021-07-24 | End: 2021-07-24 | Stop reason: HOSPADM

## 2021-07-24 RX ADMIN — VANCOMYCIN HYDROCHLORIDE 1500 MG: 100 INJECTION, POWDER, LYOPHILIZED, FOR SOLUTION INTRAVENOUS at 21:13

## 2021-07-24 RX ADMIN — PIPERACILLIN SODIUM AND TAZOBACTAM SODIUM 3.38 G: 3; .375 INJECTION, POWDER, LYOPHILIZED, FOR SOLUTION INTRAVENOUS at 23:36

## 2021-07-24 RX ADMIN — MAGNESIUM SULFATE IN WATER 4 G: 40 INJECTION, SOLUTION INTRAVENOUS at 23:32

## 2021-07-24 RX ADMIN — CANGRELOR 4 MCG/KG/MIN: 50 INJECTION, POWDER, LYOPHILIZED, FOR SOLUTION INTRAVENOUS at 14:16

## 2021-07-24 RX ADMIN — SODIUM CHLORIDE, POTASSIUM CHLORIDE, SODIUM LACTATE AND CALCIUM CHLORIDE 2000 ML: 600; 310; 30; 20 INJECTION, SOLUTION INTRAVENOUS at 17:00

## 2021-07-24 RX ADMIN — Medication 0.6 MCG/KG/MIN: at 17:30

## 2021-07-24 RX ADMIN — TICAGRELOR 90 MG: 90 TABLET ORAL at 20:54

## 2021-07-24 RX ADMIN — CALCIUM CHLORIDE 1 G: 100 INJECTION, SOLUTION INTRAVENOUS at 18:00

## 2021-07-24 RX ADMIN — Medication 50 MEQ: at 19:00

## 2021-07-24 RX ADMIN — SODIUM BICARBONATE 50 MEQ: 84 INJECTION INTRAVENOUS at 22:04

## 2021-07-24 RX ADMIN — HEPARIN SODIUM 3 ML/HR: 200 INJECTION, SOLUTION INTRAVENOUS at 16:00

## 2021-07-24 RX ADMIN — EPINEPHRINE 0.05 MCG/KG/MIN: 1 INJECTION PARENTERAL at 17:15

## 2021-07-24 RX ADMIN — SODIUM BICARBONATE 50 MEQ: 84 INJECTION INTRAVENOUS at 19:00

## 2021-07-24 RX ADMIN — VASOPRESSIN 4 UNITS/HR: 20 INJECTION INTRAVENOUS at 17:33

## 2021-07-24 RX ADMIN — EPINEPHRINE 0.11 MCG/KG/MIN: 1 INJECTION PARENTERAL at 23:45

## 2021-07-24 RX ADMIN — Medication 50 MCG/HR: at 17:45

## 2021-07-24 RX ADMIN — PANTOPRAZOLE SODIUM 40 MG: 40 INJECTION, POWDER, FOR SOLUTION INTRAVENOUS at 20:54

## 2021-07-24 RX ADMIN — Medication 4 UNITS/HR: at 21:58

## 2021-07-24 RX ADMIN — ALBUMIN (HUMAN) 1000 ML: 12.5 SOLUTION INTRAVENOUS at 16:00

## 2021-07-24 ASSESSMENT — ACTIVITIES OF DAILY LIVING (ADL)
ADLS_ACUITY_SCORE: 19
ADLS_ACUITY_SCORE: 18

## 2021-07-24 ASSESSMENT — MIFFLIN-ST. JEOR: SCORE: 1050

## 2021-07-24 NOTE — Clinical Note
The first balloon was inserted into the right coronary artery and middle right coronary artery.Max pressure = 16 seda. Total duration = 10 seconds.     Max pressure = 16 seda. Total duration = 10 seconds.   4.0x30 NC.  Balloon reinflated a second time: Max pressure = 16 seda. Total duration = 10 seconds.

## 2021-07-24 NOTE — Clinical Note
The first balloon was inserted into the right coronary artery and middle right coronary artery.Max pressure = 16 seda. Total duration = 10 seconds.     Max pressure = 16 seda. Total duration = 10 seconds.   3.5x20 NC.  Balloon reinflated a second time: Max pressure = 16 seda. Total duration = 10 seconds.  Balloon reinflated a third time: Max pressure = 16 seda. Total duration = 10 seconds.

## 2021-07-24 NOTE — Clinical Note
ECMO changed to a 17 fr long arterial sheath and new long 8 fr flex long rfa for distal reperfusion-sites dressed and capped-

## 2021-07-24 NOTE — Clinical Note
The first balloon was inserted into the right coronary artery and proximal right coronary artery.Max pressure = 12 seda. Total duration = 10 seconds.

## 2021-07-24 NOTE — Clinical Note
Arrhythmia Type: polymorphic VT.   Method of Cardioversion: defibrillated.   The arrhythmia was terminated.   Energy shock delivered: 200 joules.   Time shock delivered: 13:21 CDT.   Post cardioversion rhythm: sinus rhythm.

## 2021-07-24 NOTE — Clinical Note
Arrhythmia Type: polymorphic VT.   Method of Cardioversion: defibrillated.   The arrhythmia was terminated.   Energy shock delivered: 200 joules.   Time shock delivered: 13:18 CDT.   Post cardioversion rhythm: sinus rhythm.

## 2021-07-24 NOTE — Clinical Note
Stent deployed in the proximal right coronary artery. Max pressure = 14 seda. Total duration = 10 seconds. Balloon reinflated a second time: 3.5x32 synergy

## 2021-07-24 NOTE — Clinical Note
Stent deployed in the middle right coronary artery. Max pressure = 12 seda. Total duration = 10 seconds. Balloon reinflated a second time: Max pressure = 12 seda. Total duration = 10 seconds. 3.0x38 stent

## 2021-07-24 NOTE — Clinical Note
Potential access sites were evaluated for patency using ultrasound.   The Left femoral artery was selected. Access was obtained under with Sonosite guidance using a standard 18 guage needle with direct visualization of needle entry.

## 2021-07-24 NOTE — Clinical Note
The first balloon was inserted into the right coronary artery and proximal right coronary artery.Max pressure = 12 seda. Total duration = 10 seconds.     3.5x32.

## 2021-07-24 NOTE — Clinical Note
The first balloon was inserted into the right coronary artery and proximal right coronary artery.Max pressure = 12 seda. Total duration = 10 seconds.     Max pressure = 12 seda. Total duration = 10 seconds.   4.0x15 NC.  Balloon reinflated a second time: Max pressure = 12 seda. Total duration = 10 seconds.  Balloon reinflated a third time: Max pressure = 16 seda. Total duration = 10 seconds.

## 2021-07-24 NOTE — PHARMACY-VANCOMYCIN DOSING SERVICE
"Pharmacy Vancomycin Initial Note  Date of Service 2021  Patient's  1902  89 year old, female    Indication: Cardiac arrest/ECMO     Current estimated CrCl = Estimated Creatinine Clearance: 12.4 mL/min (A) (based on SCr of 1.17 mg/dL (H)).    Creatinine for last 3 days  2021:  3:35 PM Creatinine 1.17 mg/dL    Nephrotoxins and other renal medications (From now, onward)    Start     Dose/Rate Route Frequency Ordered Stop    21 1700  vancomycin 1500 mg in 0.9% NaCl 250 ml intermittent infusion 1,500 mg      1,500 mg  over 90 Minutes Intravenous EVERY 24 HOURS 21 1618 21 1659    21 1630  piperacillin-tazobactam (ZOSYN) 3.375 g vial to attach to  mL bag     Note to Pharmacy: For [facility], [facility] and [facility]: For Zosyn-naive patients, use the \"Zosyn initial dose + extended infusion\" order panel.    3.375 g  over 30 Minutes Intravenous EVERY 6 HOURS 21 1529 21 1629    21 1630  norepinephrine (LEVOPHED) 16 mg in  mL infusion MAX CONC CENTRAL LINE      0.01-0.6 mcg/kg/min × 80 kg (Dosing Weight)  0.8-45 mL/hr  Intravenous CONTINUOUS 21 1609      21 1530  vasopressin 0.2 units/mL in NS (PITRESSIN) standard conc infusion      0.5-4 Units/hr  2.5-20 mL/hr  Intravenous CONTINUOUS 21 1529            Contrast Orders - past 72 hours (72h ago, onward)    Start     Dose/Rate Route Frequency Ordered Stop    21 1429  iopamidol (ISOVUE-370) solution  Status:  Discontinued        ONCE PRN 21 1430 21 1520        Plan:  1. Start vancomycin  1500 mg (18.8mg/kg) IV q24h x5 days per protocol .   2. Vancomycin monitoring method: Trough (Method 2 = manual dose calculation)  3. Vancomycin therapeutic monitoring goal: 15-20 mg/L  4. Pharmacy will check vancomycin levels as appropriate in 1-3 Days.    5. Serum creatinine levels will be ordered daily.      Jenniffer Wells formerly [facility]  "

## 2021-07-24 NOTE — PROGRESS NOTES
Patient is a 89 year old female admitted with:    Patient Active Problem List   Diagnosis     Cardiac arrest (H)   .    Patient is on mechanical ventilator, RR 10/min, SpO2 100%, breath sounds coarse, equal bilaterally.    PC 30/10 r10 100%    Plan is to assess and monitor.    NIVIA XIE, RT, RRT  7/24/2021 3:36 PM

## 2021-07-24 NOTE — PROGRESS NOTES
ECLS Cannulation Note:    Date on: 7/24/2021  Time on: 12:53  Surgeon: Ivan    Arterial Cannula: 15 Fr. In the Right Femoral Artery      Venous Cannula: 25 Fr. In the Right Femoral Vein      ECMO components include [employer] Lot # [id number]  Oxygenator Lot Number: [id number]  Console Serial Number: [id number]    Cannulation was performed in the Cath Lab, placement was verified by fluoroscopy, cannulas are in good position.  ISTAT and blood cooler are at bedside. Patient's blood type is pending type and cross.    Shubham Silva, RRT  ECMO Specialist  7/24/2021 5:53 PM

## 2021-07-24 NOTE — H&P
Critical Care Cardiology   History and Physical  Anonymous Jasper Ulloa MRN: [id number]  Age: 89 year old, : 1902  Date: 2021    History of Present Illness     Ms Karen Jacobsen is a  60 year old female being admitted on 2021. The patient has a PMHx of multiple sclerosis and sarcoidosis (unclear treatment details). She reportedly complained of an episode of chest pain/shortness of breath two weeks ago; and another episode of chest pain (confused for heartburn) earlier today. About 30 mins after the episode of chest pain, she lost consciousness (witnessed arrest) in front of her family; no bystander CPR given. Five mins later (11:45 am), EMS arrived at the scene. Details listed below:    Witnessed arest (y/n): yes  Home or public location: Home  Bystander CPR (y/n): No  Time of 911 call: 11:45 am  Initial rhythm: VFib  Did they have intermittent ROSC (y/n): No  # of shocks: 1  Epi:  2 amps  Amio: None  Bicarb: None  Duration of CPR ~ 60 mins.    Initial rhythm in the cath lab: PEA  Initial AB.8/76/102/14    At baseline, the patient is functionally active. MS not debilitating enough to necessitate cane/walker/wheelchair. She's able to buy groceries, do yard work without assistance.     Social hx: lives with her family. Daughter, Nataly is her 24 hour caregiver.    Medications   I have reviewed this patient's current medications    No past medical history on file.    No family history on file.  Social History     Socioeconomic History     Marital status: Not on file     Spouse name: Not on file     Number of children: Not on file     Years of education: Not on file     Highest education level: Not on file   Occupational History     Not on file   Tobacco Use     Smoking status: Not on file   Substance and Sexual Activity     Alcohol use: Not on file     Drug use: Not on file     Sexual activity: Not on file   Other Topics Concern     Not on file   Social History Narrative     Not on file      Social Determinants of Health     Financial Resource Strain:      Difficulty of Paying Living Expenses:    Food Insecurity:      Worried About Running Out of Food in the Last Year:      Ran Out of Food in the Last Year:    Transportation Needs:      Lack of Transportation (Medical):      Lack of Transportation (Non-Medical):    Physical Activity:      Days of Exercise per Week:      Minutes of Exercise per Session:    Stress:      Feeling of Stress :    Social Connections:      Frequency of Communication with Friends and Family:      Frequency of Social Gatherings with Friends and Family:      Attends Mandaen Services:      Active Member of Clubs or Organizations:      Attends Club or Organization Meetings:      Marital Status:    Intimate Partner Violence:      Fear of Current or Ex-Partner:      Emotionally Abused:      Physically Abused:      Sexually Abused:        Physical Exam   BMI= There is no height or weight on file to calculate BMI.     GENERAL APPEARANCE: Intubated, sedated. NAD.  HEENT: No icterus, PERRL 2 mm, ETT in place, OG tube in place.  CARDIOVASCULAR: Regular rate and rhythm, normal S1/S2, no S3/S4 and no murmur, click or rub. Normal PMI. DP Pulses dopplerable.  RESP: Coarse bilaterally. Mechanical ventilation.    GASTRO: Soft, bowel sounds hypoactive but present.  GENITOURINARY: Aranda in place.  EXTREMITIES: Cool, DP pulses dopplered.   NEURO: Sedated and intubated, pupils equal and reactive.   INTEGUMENTARY: No rashes. Cannula/Line sites CDI      Arterial Blood Gas:   Recent Labs   Lab 07/24/21  1314 07/24/21  1302 07/24/21  1249   PH 7.29* 7.03* 6.88*   PCO2 37 37 76*   PO2 221* 203* 102   HCO3 18* 10* 14*     There were no vitals filed for this visit.No intake/output data recorded.  Recent Labs   Lab 07/24/21  1314 07/24/21  1302    138   POTASSIUM 4.2 4.7   * 339*     No components found for: URINE   No lab results found in last 7 days.     No data recorded   Recent Labs    Lab 07/24/21  1314 07/24/21  1302 07/24/21  1249   HGB 6.1* 6.8* 8.0*     No lab results found in last 7 days.  Recent Labs   Lab 07/24/21  1314 07/24/21  1302 07/24/21  1249   * 339* 400*       Assessment and Plan   Ms Karen Jacobsen is a  60 year old female being admitted on 7/24/2021. The patient has a PMHx of multiple sclerosis and sarcoidosis (unclear treatment details). The patient presented on 7/24/2021 as a VFib ECMO activation. She was found to have inferior STEMI with 100% occlusion of Ost RCA to Dist RCA lesion 100% stenoses (type C- high risk lesion), status post PCI (stenting and thrombectomy) of proximal to mid RCA, DAVIAN 3 flow obtained. Patient transferred to the CICU for further management of cardiac arrest s/p VA ECMO cannulation.       Neurology:   Hypothermia portocol initiated with thermaguard.  Intubated, sedated. Cooled to 34 degrees.    Imaging/procedures:   CTH: Prelim read: Slightly decreased gray-white matter differentiation with mild effacement of cerebral sulci, concerning for hypoxic injury and cerebral edema.  EEG: ordered    Sedation/paralytics:   Versed @ 5mg/hr, Fentanyl @ 50 mcg/hr.    Plan:   - Continue versed, fentanyl as needed for sedation.  - vEEG monitoring.  - Follow up CTH in 72 hours.   - Allow permissive hypercapnia.  - Neurocritical Care on board, appreciate recs.      Cardiovascular:     # VFib cardiac arrest  # Inferior STEMI s/p MIC to prox-distal RCA  # Lactic acidosis  # Cardiogenic shock    Refractory VF arrest s/p peripheral VA ECMO insertion. Initial LA 10.     MCS:   VA ECMO- ACT goal: 180-200.  IABP not required.     Imaging/procedures:   Coronary angiogram: 100% occlusion of Ost RCA to Dist RCA lesion 100% stenoses (type C- high risk lesion), status post PCI (stenting and thrombectomy) of proximal to mid RCA; LAD & LCx patent.   TTE: pending  EKG: pending    Vasoactive/antiarrhythmic infusions:   Epi @ 0.1, Levo @0.6, Vaso @ 4.    Plan:  - Drawing ABGs  from RRA.   - Wean pressors/inotropes as able  - Echo with ECMO turndown in 48 hours.   - Hold temp at 34 degrees.  - ACT goal 180-200  - Continue ASA and ticagrelor.  - hold ACE/ARB given likely reduced renal fxn after arrest  - hold beta blocker given shock   - hold statin given likely hepatic injury during arrest    Pulmonary:  # Acute hypoxic resp failure    Vent settings:   Ventilation Mode: PCV Plus assist  (Pressure Control Ventilation/ Assist Control)  FiO2 (%): 60 %  Rate Set (breaths/minute): 10 breaths/min  PEEP (cm H2O): 10 cmH2O  Oxygen Concentration (%): 60 %  Peak Inspiratory Pressure (cm H2O) (Drager Muna): 30  Resp: 10    Imaging/procedures:   CT chest: Scattered debris within the right lower and left lower lobes with patchy consolidative opacities throughout the right upper and right lower lobes, concerning for aspiration/infection. Acute fractures involving right ribs 3-7 and left ribs 1-8.    Plan:   -wean vent as able  -daily CXR  -Q2h ABGs for now  -consider scheduled duonebs if signs of lung dz, currently PRN     GI and Nutrition:   # NPO while hypothermic  # Concern for Ischemic hepatitis:      Imaging/procedures:   CT abd/pelvis: Moderate right groin hematoma extending into the pelvis along the right pelvic sidewall, likely related to recent catheterization. Postsurgical changes of gastric bypass with borderline dilated and  fluid-filled loops of large and small, concerning for developing  ileus.     Plan:   - Monitor BID LFTs.  - NPO for now.  - Bowel regimen - on hold for now.  - GI prophylaxis: Protonix 40 IV daily.     Renal, Fluid, and Electrolytes:   # Monitor for MACARENA    Plan:   -monitor urine output  -maintain K>3 and Mg>2    Infectious Disease:   # Aspiration pneumonia  Blood/sputum cultures collected.   Plan:   -vancomycin/zosyn x5 days for aspiration pneumonia   -daily blood cultures while on ECMO   -monitor for signs of infection given cooling, lines, and  leukocytosis    Hematology and Oncology:   # Anemia (?ACD vs MISA)  # Moderate R groin hematoma  Receiving heparin for ECMO and ASA/ticagrelor for MIC.   Plan:   -heparin gtt for ECMO with ACT goal 180-200  -cryo PRN for fibrinogen < 200; FFP for INR >2  -transfuse for hgb <8  -US LE w/ arterial duplex per ECMO protocol   -DVT prophylaxis: heparin gtt    Endocrine:   No known medical history. BG elevated in setting of critical illness.  Plan:   -insulin gtt if needed.     Lines:   R femoral arterial and venous ECMO cannulae July 24, 2021  L femoral arterial sheath July 24, 2021  L femoral Thermoguard July 24, 2021  R radial arterial line July 24, 2021  ETT July 24, 2021  Aranda catheter July 24, 2021  OG tube July 24, 2021    Current lines are required for patient management      Family update by me today: Yes     Code Status: Full    The pt was discussed and evaluated with Karlos Alegria MD, attending physician, who agrees with the assessment and plan above.     Donna Ash MD,   Cardiovascular Disease Fellow  Pager [phone]

## 2021-07-24 NOTE — CONSULTS
[facility]  Neurocritical Care Consultation    Patient Name:  Martine Ulloa  MRN:  [id number]    :  1902  Date of Service:  2021  Primary care provider:  No primary care provider on file.      Neurocritical care service was asked to see Martine Ulloa to evaluate for post-cardiac arrest progonostication.      HPI: (As per chart review)  Ms Karen Jacobsen is a 60 year old female with a PMHx of multiple sclerosis and sarcoidosis who was admitted on 2021 following cardiac arrest. The patient is sedated and under cooling protocol, therefore unable to provide a history. History was obtained from chart review. She reportedly complained of an episode of chest pain/shortness of breath two weeks ago; and another episode of chest pain (confused for heartburn) earlier today. About 30 mins after the episode of chest pain, she lost consciousness (witnessed arrest) in front of her family; no bystander CPR given. Five mins later (11:45 am), EMS arrived at the scene.    Witnessed arest (y/n): yes  Home or public location: Home  Bystander CPR (y/n): No  Time of 911 call: 11:45 am  Initial rhythm: VFib  Did they have intermittent ROSC (y/n): No  # of shocks: 1  Epi:  2 amps  Amio: None  Bicarb: None  Duration of CPR ~ 60 mins.     Initial rhythm in the cath lab: PEA  Initial AB.8/76/102/14    She was found to have inferior STEMI with 100% occlusion of Ost RCA to Dist RCA lesion 100% stenosed, status post PCI (stenting and thrombectomy) of proximal to mid RCA DAVIAN 3, and ECMO was initiated.     At baseline, the patient is functionally active. MS not debilitating enough to necessitate cane/walker/wheelchair. She's able to buy groceries, do yard work without assistance.     Meds receiving that are CNS acting per MAR:  Midazolam 5 ml/ hr IV infusion 1433 also at 1341,1432,1444 4 mg each    ROS    Unable to perform due to altered mental status.    PMH  No  past medical history on file.  No past surgical history on file.    Medications     Current Facility-Administered Medications:      artificial tears ophthalmic ointment, , Both Eyes, Q8H, Donna Ash MD     calcium chloride in  mL intermittent infusion 1 g, 1 g, Intravenous, Q6H PRN, Donna Ash MD     [COMPLETED] cangrelor (KENGREAL) 200 mcg/mL bolus dose from infusion pump 30 mcg/kg (Dosing Weight), 30 mcg/kg (Dosing Weight), Intravenous, Once, Given at 07/24/21 1415 **FOLLOWED BY** cangrelor (KENGREAL) 50 mg in sodium chloride 0.9 % 250 mL infusion, 4 mcg/kg/min (Dosing Weight), Intravenous, Continuous, Karlos Love MD, 4 mcg/kg/min at 07/24/21 1416     EPINEPHrine (ADRENALIN) 5 mg in sodium chloride 0.9 % 250 mL infusion, 0.03-0.3 mcg/kg/min (Dosing Weight), Intravenous, Continuous, Donna Ash MD     fentaNYL (SUBLIMAZE) 50 mcg/mL bolus from infusion pump 50 mcg, 50 mcg, Intravenous, Q30 Min PRN, Donna Ash MD     fentaNYL (SUBLIMAZE) infusion,  mcg/hr, Intravenous, Continuous, Donna Ash MD     heparin (porcine) 100 unit/mL in 0.45% Sodium Chloride ANTICOAGULANT infusion, 10-50 Units/kg/hr (Ideal), Other, Continuous, Karlos Love MD     heparin 2 unit/mL in 0.9% NaCl (for REPERFUSION CATHETER), 3 mL/hr, Intravenous, Continuous, Donna Ash MD, Last Rate: 3 mL/hr at 07/24/21 1600, 3 mL/hr at 07/24/21 1600     heparin ANTICOAGULANT bolus dose from infusion pump 273.5-547 Units, 5-10 Units/kg (Ideal), Other, Q30 Min PRN, Karlos Love MD     lidocaine (LMX4) cream, , Topical, Q1H PRN, Donna Ash MD     lidocaine 1 % 0.1-1 mL, 0.1-1 mL, Other, Q1H PRN, Donna Ash MD     magnesium sulfate 2 g in water intermittent infusion, 2 g, Intravenous, Daily PRN, Donna Ash MD     magnesium sulfate 4 g in 100 mL sterile water (premade), 4 g, Intravenous, Q4H PRN, Donna Ash MD     Medication Considerations - To maintain platelet inhibition after  discontinuation of cangrelor (KENGREAL) [see admin instructions], , Does not apply, Continuous PRN, Karlos Love MD     midazolam (VERSED) drip - ADULT 100 mg/100 mL in NS (pre-mix), 1-8 mg/hr, Intravenous, Continuous, Donna Ash MD, Last Rate: 5 mL/hr at 07/24/21 1700, 5 mg/hr at 07/24/21 1700     midazolam (VERSED) injection 1-3 mg, 1-3 mg, Intravenous, Q1H PRN, Donna Ash MD     naloxone (NARCAN) injection 0.2 mg, 0.2 mg, Intravenous, Q2 Min PRN **OR** naloxone (NARCAN) injection 0.4 mg, 0.4 mg, Intravenous, Q2 Min PRN **OR** naloxone (NARCAN) injection 0.2 mg, 0.2 mg, Intramuscular, Q2 Min PRN **OR** naloxone (NARCAN) injection 0.4 mg, 0.4 mg, Intramuscular, Q2 Min PRN, Karlos Love MD     norepinephrine (LEVOPHED) 16 mg in  mL infusion MAX CONC CENTRAL LINE, 0.01-0.6 mcg/kg/min (Dosing Weight), Intravenous, Continuous, Karlos Love MD, Last Rate: 45 mL/hr at 07/24/21 1700, 0.6 mcg/kg/min at 07/24/21 1700     pantoprazole (PROTONIX) IV push injection 40 mg, 40 mg, Intravenous, Daily, Donna Ash MD     phenylephrine (ALEJANDRINA-SYNEPHRINE) 50 mg in NaCl 0.9 % 250 mL infusion, 0.5-6 mcg/kg/min (Dosing Weight), Intravenous, Continuous, Donna Ash MD     piperacillin-tazobactam (ZOSYN) 3.375 g vial to attach to  mL bag, 3.375 g, Intravenous, Q6H, Donna Ash MD     potassium chloride 20 mEq in 50 mL intermittent infusion, 20 mEq, Intravenous, Q1H PRN, Donna Ash MD     propofol (DIPRIVAN) infusion, 5-75 mcg/kg/min (Dosing Weight), Intravenous, Continuous, Donna Ash MD, Held at 07/24/21 1701     sodium chloride (PF) 0.9% PF flush 3 mL, 3 mL, Intracatheter, q1 min prn, Donna Ash MD     sodium phosphate (NaPHOS) 15 mMol in 250 mL D5W PREMADE infusion, 15 mmol, Intravenous, Daily PRN, Donna Ash MD     sodium phosphate 10 mmol in sodium chloride 0.9 % intermittent infusion, 10 mmol, Intravenous, Daily PRN, Donna Ash MD     sodium phosphate 20 mmol in  "sodium chloride 0.9 % intermittent infusion, 20 mmol, Intravenous, Q6H PRN, Donna Ash MD     sodium phosphate 25 mmol in sodium chloride 0.9 % intermittent infusion, 25 mmol, Intravenous, Q8H PRN, Donna Ash MD     vancomycin 1500 mg in 0.9% NaCl 250 ml intermittent infusion 1,500 mg, 1,500 mg, Intravenous, Q24H, Karlos Love MD     vasopressin 0.2 units/mL in NS (PITRESSIN) standard conc infusion, 0.5-4 Units/hr, Intravenous, Continuous, Donna Ash MD, Last Rate: 20 mL/hr at 07/24/21 1700, 4 Units/hr at 07/24/21 1700    Allergies  Not on File    Social History  Unable to perform due to altered mental status    Family History    Unable to perform due to altered mental status           Physical Exam     Vitals: Pulse 64   Temp (!) 89.6  F (32  C) (Esophageal)   Resp 25   Ht 1.626 m (5' 4\")   Wt 80 kg (176 lb 5.9 oz)   SpO2 99%   BMI 30.27 kg/m    General: Sedated, no acute distress  HEENT: Normocephalic, atraumatic. Sclera anicteric.  Resp: Intubated, on mechanical ventilation. Bilaterally clear to auscultation. No wheezes or crackles.   CVS: Regular rate and rhythm. Normal S1/S2, no murmurs.  Abdomen: Soft, non-distended, non-tender. Bowel sounds normoactive.  Skin: Cool, dry. No jaundice.  Neuro: (performed while the patient was on sedation.  Mental status: Does not open eyes to verbal or noxious stimuli. Does not follow commands.   Cranial nerves: Eyes conjugate. Pupils pinpoint, equal, round, non reactive to light. Absent oculocephalic, Absent corneal, and cough un testable.  Motor: Normal tone. No abnormal movements. Does not spontaneously move extremities.  Reflexes: Normoreflexic and symmetric biceps, brachioradialis, patellar, and achilles. Toes Mute.  Sensory: no response to noxious stimuli in all four extremities.   Coordination: Unable to assess due to mental status.    Investigations   Head CT 07/24/2021  RESIDENT PRELIMINARY INTERPRETATION  Impression:  Slightly decreased " gray-white matter differentiation with mild  effacement of cerebral sulci, concerning for hypoxic injury and  cerebral edema.    CT Chest abdomen pelvis  RESIDENT PRELIMINARY INTERPRETATION  IMPRESSION:  1. Scattered debris within the right lower and left lower lobes with  patchy consolidative opacities throughout the right upper and right  lower lobes, concerning for aspiration/infection.  2. Acute fractures involving right ribs 3-7 and left ribs 1-8 with  tiny bilateral pneumothoraces.  3. Small right and trace left pleural effusions.  4. Moderate right retroperitoneal hematoma, likely related to recent  catheterization.  5. Postsurgical changes of gastric bypass with borderline dilated and  fluid-filled loops of large and small, concerning for developing  ileus.  6. Severe coronary artery calcifications.  7. Patchy consolidation overlying the right middle lobe, likely  representing pulmonary contusion.           Impression     Ms Karen Jacobsen is a 60 year old female with a PMHx of multiple sclerosis and sarcoidosis who presented on 7/24/2021 post CPR of 60 minutes, ROSC not achieved, inferior wall STEMI post PCI, on ECMO. The NCC service was consulted to evaluate for prognostication. Her neurologic examination is limited as the patient remains cooled at this time and she is receiving sedation. Head imaging CT brain demonstrated establishing infarcts in the right MCA territory, perhaps diffuse anoxic injury as well, likely secondary to prolonged down time and we will continue to follow.         Recommendations       - vEEG continuous  - Avoid hypotonic solutions as they worsen cerebral edema  - Avoid nitroprusside/nitroglycerin as able - can increase ICP's   - Advise slow correction of any high sodiums if needed  - When safe to do so, limitation of CNS acting medications will permit a more accurate neurological assessment  - Wean sedation as able  - Will follow neurologic examination once rewarmed and weaned of  sedation  - The neurocritical care service will continue to follow    The patient was discussed with Dr. Dillard NCC/Stroke Fellow and Dr. Breaux, staff attending, who agrees with my assessment and plan    Christopher Mojica MD  Neurology Resident  [phone]

## 2021-07-25 ENCOUNTER — APPOINTMENT (OUTPATIENT)
Dept: ULTRASOUND IMAGING | Facility: CLINIC | Age: 61
End: 2021-07-25
Attending: INTERNAL MEDICINE
Payer: MEDICAID

## 2021-07-25 ENCOUNTER — APPOINTMENT (OUTPATIENT)
Dept: CT IMAGING | Facility: CLINIC | Age: 61
End: 2021-07-25
Attending: INTERNAL MEDICINE
Payer: MEDICAID

## 2021-07-25 ENCOUNTER — APPOINTMENT (OUTPATIENT)
Dept: NEUROLOGY | Facility: CLINIC | Age: 61
End: 2021-07-25
Attending: INTERNAL MEDICINE
Payer: MEDICAID

## 2021-07-25 ENCOUNTER — APPOINTMENT (OUTPATIENT)
Dept: CARDIOLOGY | Facility: CLINIC | Age: 61
End: 2021-07-25
Attending: INTERNAL MEDICINE
Payer: MEDICAID

## 2021-07-25 LAB
ACT BLD: 138 SECONDS (ref 74–150)
ACT BLD: 146 SECONDS (ref 74–150)
ACT BLD: 151 SECONDS (ref 74–150)
ACT BLD: 191 SECONDS (ref 74–150)
ACT BLD: 195 SECONDS (ref 74–150)
ALBUMIN SERPL-MCNC: 1.1 G/DL (ref 3.4–5)
ALBUMIN SERPL-MCNC: 1.3 G/DL (ref 3.4–5)
ALBUMIN SERPL-MCNC: 1.5 G/DL (ref 3.4–5)
ALBUMIN SERPL-MCNC: 1.5 G/DL (ref 3.4–5)
ALBUMIN SERPL-MCNC: 1.8 G/DL (ref 3.4–5)
ALP SERPL-CCNC: 25 U/L (ref 40–150)
ALP SERPL-CCNC: 34 U/L (ref 40–150)
ALP SERPL-CCNC: 40 U/L (ref 40–150)
ALP SERPL-CCNC: 43 U/L (ref 40–150)
ALP SERPL-CCNC: 51 U/L (ref 40–150)
ALT SERPL W P-5'-P-CCNC: 321 U/L (ref 0–50)
ALT SERPL W P-5'-P-CCNC: 48 U/L (ref 0–50)
ALT SERPL W P-5'-P-CCNC: 55 U/L (ref 0–50)
ALT SERPL W P-5'-P-CCNC: 63 U/L (ref 0–50)
ALT SERPL W P-5'-P-CCNC: 94 U/L (ref 0–50)
ANION GAP SERPL CALCULATED.3IONS-SCNC: 10 MMOL/L (ref 3–14)
ANION GAP SERPL CALCULATED.3IONS-SCNC: 10 MMOL/L (ref 3–14)
ANION GAP SERPL CALCULATED.3IONS-SCNC: 13 MMOL/L (ref 3–14)
ANION GAP SERPL CALCULATED.3IONS-SCNC: 15 MMOL/L (ref 3–14)
ANION GAP SERPL CALCULATED.3IONS-SCNC: 19 MMOL/L (ref 3–14)
APTT PPP: 45 SECONDS (ref 22–38)
APTT PPP: 55 SECONDS (ref 22–38)
APTT PPP: 59 SECONDS (ref 22–38)
APTT PPP: 86 SECONDS (ref 22–38)
AST SERPL W P-5'-P-CCNC: 215 U/L (ref 0–45)
AST SERPL W P-5'-P-CCNC: 218 U/L (ref 0–45)
AST SERPL W P-5'-P-CCNC: 226 U/L (ref 0–45)
AST SERPL W P-5'-P-CCNC: 260 U/L (ref 0–45)
AST SERPL W P-5'-P-CCNC: 999 U/L (ref 0–45)
AT III ACT/NOR PPP CHRO: 45 % (ref 85–135)
BASE EXCESS BLDA CALC-SCNC: -1.7 MMOL/L (ref -9–1.8)
BASE EXCESS BLDA CALC-SCNC: -10.4 MMOL/L (ref -9–1.8)
BASE EXCESS BLDA CALC-SCNC: -10.5 MMOL/L (ref -9–1.8)
BASE EXCESS BLDA CALC-SCNC: -10.6 MMOL/L (ref -9–1.8)
BASE EXCESS BLDA CALC-SCNC: -10.7 MMOL/L (ref -9–1.8)
BASE EXCESS BLDA CALC-SCNC: -10.8 MMOL/L (ref -9–1.8)
BASE EXCESS BLDA CALC-SCNC: -11.3 MMOL/L (ref -9–1.8)
BASE EXCESS BLDA CALC-SCNC: -14.8 MMOL/L (ref -9–1.8)
BASE EXCESS BLDA CALC-SCNC: -15.3 MMOL/L (ref -9–1.8)
BASE EXCESS BLDA CALC-SCNC: -3.8 MMOL/L (ref -9–1.8)
BASE EXCESS BLDA CALC-SCNC: -6.7 MMOL/L (ref -9–1.8)
BASE EXCESS BLDA CALC-SCNC: -8.2 MMOL/L (ref -9–1.8)
BASE EXCESS BLDV CALC-SCNC: -10.6 MMOL/L (ref -7.7–1.9)
BILIRUB SERPL-MCNC: 0.7 MG/DL (ref 0.2–1.3)
BILIRUB SERPL-MCNC: 0.8 MG/DL (ref 0.2–1.3)
BILIRUB SERPL-MCNC: 0.9 MG/DL (ref 0.2–1.3)
BILIRUB SERPL-MCNC: 1 MG/DL (ref 0.2–1.3)
BILIRUB SERPL-MCNC: 1.2 MG/DL (ref 0.2–1.3)
BLD PROD TYP BPU: NORMAL
BLOOD COMPONENT TYPE: NORMAL
BUN SERPL-MCNC: 28 MG/DL (ref 7–30)
BUN SERPL-MCNC: 30 MG/DL (ref 7–30)
BUN SERPL-MCNC: 30 MG/DL (ref 7–30)
BUN SERPL-MCNC: 33 MG/DL (ref 7–30)
BUN SERPL-MCNC: 33 MG/DL (ref 7–30)
CA-I BLD-MCNC: 4 MG/DL (ref 4.4–5.2)
CA-I BLD-MCNC: 4.6 MG/DL (ref 4.4–5.2)
CALCIUM SERPL-MCNC: 6.4 MG/DL (ref 8.5–10.1)
CALCIUM SERPL-MCNC: 6.6 MG/DL (ref 8.5–10.1)
CALCIUM SERPL-MCNC: 6.9 MG/DL (ref 8.5–10.1)
CALCIUM SERPL-MCNC: 7.2 MG/DL (ref 8.5–10.1)
CALCIUM SERPL-MCNC: 7.5 MG/DL (ref 8.5–10.1)
CF REDUC 30M P MA P HEP LENFR BLD TEG: 0 % (ref 0–8)
CF REDUC 60M P MA P HEPASE LENFR BLD TEG: 0.4 % (ref 0–15)
CFT P HPASE BLD TEG: 1.1 MINUTE (ref 1–3)
CHLORIDE BLD-SCNC: 115 MMOL/L (ref 94–109)
CHLORIDE BLD-SCNC: 118 MMOL/L (ref 94–109)
CHLORIDE BLD-SCNC: 120 MMOL/L (ref 94–109)
CI (COAGULATION INDEX)(Z): 0.5 (ref -3–3)
CLOT ANGLE P HPASE BLD TEG: 73.7 DEGREES (ref 53–72)
CLOT INIT P HPASE BLD TEG: 6 MINUTE (ref 5–10)
CLOT STRENGTH P HPASE BLD TEG: 6.7 KD/SC (ref 4.5–11)
CO2 SERPL-SCNC: 14 MMOL/L (ref 20–32)
CO2 SERPL-SCNC: 17 MMOL/L (ref 20–32)
CO2 SERPL-SCNC: 18 MMOL/L (ref 20–32)
CO2 SERPL-SCNC: 22 MMOL/L (ref 20–32)
CO2 SERPL-SCNC: 23 MMOL/L (ref 20–32)
CODING SYSTEM: NORMAL
CREAT SERPL-MCNC: 1.03 MG/DL (ref 0.52–1.04)
CREAT SERPL-MCNC: 1.06 MG/DL (ref 0.52–1.04)
CREAT SERPL-MCNC: 1.2 MG/DL (ref 0.52–1.04)
CREAT SERPL-MCNC: 1.36 MG/DL (ref 0.52–1.04)
CREAT SERPL-MCNC: 1.41 MG/DL (ref 0.52–1.04)
CRP SERPL-MCNC: 5 MG/L (ref 0–8)
D DIMER PPP FEU-MCNC: 3.57 UG/ML FEU (ref 0–0.5)
D DIMER PPP FEU-MCNC: 3.98 UG/ML FEU (ref 0–0.5)
ERYTHROCYTE [DISTWIDTH] IN BLOOD BY AUTOMATED COUNT: 15.8 % (ref 10–15)
ERYTHROCYTE [DISTWIDTH] IN BLOOD BY AUTOMATED COUNT: 16.1 % (ref 10–15)
ERYTHROCYTE [DISTWIDTH] IN BLOOD BY AUTOMATED COUNT: 17.1 % (ref 10–15)
ERYTHROCYTE [DISTWIDTH] IN BLOOD BY AUTOMATED COUNT: 18 % (ref 10–15)
ERYTHROCYTE [SEDIMENTATION RATE] IN BLOOD BY WESTERGREN METHOD: 35 MM/HR (ref 0–30)
FIBRINOGEN PPP-MCNC: 134 MG/DL (ref 170–490)
GFR SERPL CREATININE-BSD FRML MDRD: 40 ML/MIN/1.73M2
GFR SERPL CREATININE-BSD FRML MDRD: 42 ML/MIN/1.73M2
GFR SERPL CREATININE-BSD FRML MDRD: 49 ML/MIN/1.73M2
GFR SERPL CREATININE-BSD FRML MDRD: 56 ML/MIN/1.73M2
GFR SERPL CREATININE-BSD FRML MDRD: 58 ML/MIN/1.73M2
GLUCOSE BLD-MCNC: 105 MG/DL (ref 70–99)
GLUCOSE BLD-MCNC: 153 MG/DL (ref 70–99)
GLUCOSE BLD-MCNC: 155 MG/DL (ref 70–99)
GLUCOSE BLD-MCNC: 161 MG/DL (ref 70–99)
GLUCOSE BLD-MCNC: 161 MG/DL (ref 70–99)
GLUCOSE BLD-MCNC: 56 MG/DL (ref 70–99)
GLUCOSE BLD-MCNC: 59 MG/DL (ref 70–99)
GLUCOSE BLD-MCNC: 70 MG/DL (ref 70–99)
GLUCOSE BLD-MCNC: 74 MG/DL (ref 70–99)
GLUCOSE BLDC GLUCOMTR-MCNC: 228 MG/DL (ref 70–99)
GLUCOSE BLDC GLUCOMTR-MCNC: 61 MG/DL (ref 70–99)
GLUCOSE BLDC GLUCOMTR-MCNC: 86 MG/DL (ref 70–99)
HCO3 BLD-SCNC: 11 MMOL/L (ref 21–28)
HCO3 BLD-SCNC: 13 MMOL/L (ref 21–28)
HCO3 BLD-SCNC: 16 MMOL/L (ref 21–28)
HCO3 BLD-SCNC: 18 MMOL/L (ref 21–28)
HCO3 BLD-SCNC: 19 MMOL/L (ref 21–28)
HCO3 BLD-SCNC: 20 MMOL/L (ref 21–28)
HCO3 BLD-SCNC: 21 MMOL/L (ref 21–28)
HCO3 BLD-SCNC: 23 MMOL/L (ref 21–28)
HCO3 BLDA-SCNC: 17 MMOL/L (ref 21–28)
HCO3 BLDA-SCNC: 18 MMOL/L (ref 21–28)
HCO3 BLDV-SCNC: 18 MMOL/L (ref 21–28)
HCT VFR BLD AUTO: 19 % (ref 35–47)
HCT VFR BLD AUTO: 22.1 % (ref 35–47)
HCT VFR BLD AUTO: 23.1 % (ref 35–47)
HCT VFR BLD AUTO: 30.8 % (ref 35–47)
HGB BLD-MCNC: 10.8 G/DL (ref 11.7–15.7)
HGB BLD-MCNC: 6.2 G/DL (ref 11.7–15.7)
HGB BLD-MCNC: 7.2 G/DL (ref 11.7–15.7)
HGB BLD-MCNC: 7.4 G/DL (ref 11.7–15.7)
HGB FREE PLAS-MCNC: 30 MG/DL
HOLD SPECIMEN: NORMAL
INR PPP: 1.75 (ref 0.85–1.15)
INR PPP: 1.79 (ref 0.85–1.15)
INR PPP: 2.06 (ref 0.85–1.15)
ISSUE DATE AND TIME: NORMAL
LACTATE SERPL-SCNC: 10.6 MMOL/L (ref 0.7–2)
LACTATE SERPL-SCNC: 10.7 MMOL/L (ref 0.7–2)
LACTATE SERPL-SCNC: 11.1 MMOL/L (ref 0.7–2)
LACTATE SERPL-SCNC: 6.9 MMOL/L (ref 0.7–2)
LACTATE SERPL-SCNC: 7 MMOL/L (ref 0.7–2)
LACTATE SERPL-SCNC: 7.3 MMOL/L (ref 0.7–2)
LACTATE SERPL-SCNC: 7.3 MMOL/L (ref 0.7–2)
LACTATE SERPL-SCNC: 7.5 MMOL/L (ref 0.7–2)
LACTATE SERPL-SCNC: 8.1 MMOL/L (ref 0.7–2)
LDH SERPL L TO P-CCNC: 498 U/L (ref 81–234)
LVEF ECHO: NORMAL
MAGNESIUM SERPL-MCNC: 1.8 MG/DL (ref 1.6–2.3)
MAGNESIUM SERPL-MCNC: 2.2 MG/DL (ref 1.6–2.3)
MAGNESIUM SERPL-MCNC: 2.4 MG/DL (ref 1.6–2.3)
MAGNESIUM SERPL-MCNC: 2.6 MG/DL (ref 1.6–2.3)
MAGNESIUM SERPL-MCNC: 2.7 MG/DL (ref 1.6–2.3)
MCF P HPASE BLD TEG: 57.4 MM (ref 50–70)
MCH RBC QN AUTO: 28.6 PG (ref 26.5–33)
MCH RBC QN AUTO: 28.7 PG (ref 26.5–33)
MCH RBC QN AUTO: 28.8 PG (ref 26.5–33)
MCH RBC QN AUTO: 29.6 PG (ref 26.5–33)
MCHC RBC AUTO-ENTMCNC: 32 G/DL (ref 31.5–36.5)
MCHC RBC AUTO-ENTMCNC: 32.6 G/DL (ref 31.5–36.5)
MCHC RBC AUTO-ENTMCNC: 32.6 G/DL (ref 31.5–36.5)
MCHC RBC AUTO-ENTMCNC: 35.1 G/DL (ref 31.5–36.5)
MCV RBC AUTO: 84 FL (ref 78–100)
MCV RBC AUTO: 88 FL (ref 78–100)
MCV RBC AUTO: 88 FL (ref 78–100)
MCV RBC AUTO: 90 FL (ref 78–100)
O2/TOTAL GAS SETTING VFR VENT: 60 %
O2/TOTAL GAS SETTING VFR VENT: 70 %
OXYHGB MFR BLD: 91 % (ref 92–100)
OXYHGB MFR BLD: 97 % (ref 92–100)
OXYHGB MFR BLD: 98 % (ref 92–100)
OXYHGB MFR BLDA: 97 % (ref 75–100)
OXYHGB MFR BLDA: 98 % (ref 75–100)
OXYHGB MFR BLDV: 75 %
PCO2 BLD: 29 MM HG (ref 35–45)
PCO2 BLD: 35 MM HG (ref 35–45)
PCO2 BLD: 36 MM HG (ref 35–45)
PCO2 BLD: 36 MM HG (ref 35–45)
PCO2 BLD: 37 MM HG (ref 35–45)
PCO2 BLD: 37 MM HG (ref 35–45)
PCO2 BLD: 40 MM HG (ref 35–45)
PCO2 BLD: 41 MM HG (ref 35–45)
PCO2 BLD: 45 MM HG (ref 35–45)
PCO2 BLD: 75 MM HG (ref 35–45)
PCO2 BLDA: 42 MM HG (ref 35–45)
PCO2 BLDA: 51 MM HG (ref 35–45)
PCO2 BLDV: 51 MM HG (ref 40–50)
PH BLD: 7.02 [PH] (ref 7.35–7.45)
PH BLD: 7.16 [PH] (ref 7.35–7.45)
PH BLD: 7.2 [PH] (ref 7.35–7.45)
PH BLD: 7.21 [PH] (ref 7.35–7.45)
PH BLD: 7.22 [PH] (ref 7.35–7.45)
PH BLD: 7.26 [PH] (ref 7.35–7.45)
PH BLD: 7.26 [PH] (ref 7.35–7.45)
PH BLD: 7.3 [PH] (ref 7.35–7.45)
PH BLD: 7.37 [PH] (ref 7.35–7.45)
PH BLD: 7.4 [PH] (ref 7.35–7.45)
PH BLDA: 7.15 [PH] (ref 7.35–7.45)
PH BLDA: 7.2 [PH] (ref 7.35–7.45)
PH BLDV: 7.15 [PH] (ref 7.32–7.43)
PHOSPHATE SERPL-MCNC: 6.9 MG/DL (ref 2.5–4.5)
PLATELET # BLD AUTO: 101 10E3/UL (ref 150–450)
PLATELET # BLD AUTO: 35 10E3/UL (ref 150–450)
PLATELET # BLD AUTO: 48 10E3/UL (ref 150–450)
PLATELET # BLD AUTO: 73 10E3/UL (ref 150–450)
PO2 BLD: 113 MM HG (ref 80–105)
PO2 BLD: 124 MM HG (ref 80–105)
PO2 BLD: 142 MM HG (ref 80–105)
PO2 BLD: 151 MM HG (ref 80–105)
PO2 BLD: 152 MM HG (ref 80–105)
PO2 BLD: 231 MM HG (ref 80–105)
PO2 BLD: 302 MM HG (ref 80–105)
PO2 BLD: 363 MM HG (ref 80–105)
PO2 BLD: 501 MM HG (ref 80–105)
PO2 BLD: 62 MM HG (ref 80–105)
PO2 BLDA: 201 MM HG (ref 80–105)
PO2 BLDA: 309 MM HG (ref 80–105)
PO2 BLDV: 43 MM HG (ref 25–47)
POTASSIUM BLD-SCNC: 3.6 MMOL/L (ref 3.4–5.3)
POTASSIUM BLD-SCNC: 3.6 MMOL/L (ref 3.4–5.3)
POTASSIUM BLD-SCNC: 3.9 MMOL/L (ref 3.4–5.3)
POTASSIUM BLD-SCNC: 3.9 MMOL/L (ref 3.4–5.3)
POTASSIUM BLD-SCNC: 4 MMOL/L (ref 3.4–5.3)
PROT SERPL-MCNC: 1.6 G/DL (ref 6.8–8.8)
PROT SERPL-MCNC: 2.3 G/DL (ref 6.8–8.8)
PROT SERPL-MCNC: 2.8 G/DL (ref 6.8–8.8)
PROT SERPL-MCNC: 3 G/DL (ref 6.8–8.8)
PROT SERPL-MCNC: 3.4 G/DL (ref 6.8–8.8)
RADIOLOGIST FLAGS: ABNORMAL
RBC # BLD AUTO: 2.17 10E6/UL (ref 3.8–5.2)
RBC # BLD AUTO: 2.51 10E6/UL (ref 3.8–5.2)
RBC # BLD AUTO: 2.57 10E6/UL (ref 3.8–5.2)
RBC # BLD AUTO: 3.65 10E6/UL (ref 3.8–5.2)
SODIUM SERPL-SCNC: 146 MMOL/L (ref 133–144)
SODIUM SERPL-SCNC: 147 MMOL/L (ref 133–144)
SODIUM SERPL-SCNC: 148 MMOL/L (ref 133–144)
SODIUM SERPL-SCNC: 151 MMOL/L (ref 133–144)
SODIUM SERPL-SCNC: 152 MMOL/L (ref 133–144)
TROPONIN I SERPL-MCNC: 60.1 UG/L (ref 0–0.04)
TROPONIN I SERPL-MCNC: 65.69 UG/L (ref 0–0.04)
TROPONIN I SERPL-MCNC: 67.77 UG/L (ref 0–0.04)
TROPONIN I SERPL-MCNC: 67.89 UG/L (ref 0–0.04)
TROPONIN I SERPL-MCNC: 81.12 UG/L (ref 0–0.04)
UFH PPP CHRO-ACNC: <0.1 IU/ML
UNIT ABO/RH: NORMAL
UNIT NUMBER: NORMAL
UNIT STATUS: NORMAL
UNIT TYPE ISBT: 6200
UNIT TYPE ISBT: 6200
UNIT TYPE ISBT: 8400
UNIT TYPE ISBT: 9500
WBC # BLD AUTO: 10.5 10E3/UL (ref 4–11)
WBC # BLD AUTO: 12.6 10E3/UL (ref 4–11)
WBC # BLD AUTO: 8.8 10E3/UL (ref 4–11)
WBC # BLD AUTO: 9.2 10E3/UL (ref 4–11)

## 2021-07-25 PROCEDURE — 83051 HEMOGLOBIN PLASMA: CPT | Performed by: INTERNAL MEDICINE

## 2021-07-25 PROCEDURE — 85730 THROMBOPLASTIN TIME PARTIAL: CPT | Performed by: INTERNAL MEDICINE

## 2021-07-25 PROCEDURE — 85347 COAGULATION TIME ACTIVATED: CPT

## 2021-07-25 PROCEDURE — 99222 1ST HOSP IP/OBS MODERATE 55: CPT | Mod: 25 | Performed by: PSYCHIATRY & NEUROLOGY

## 2021-07-25 PROCEDURE — 258N000003 HC RX IP 258 OP 636: Performed by: INTERNAL MEDICINE

## 2021-07-25 PROCEDURE — 272N000053 HC CANNULA, ARTERIAL FEMORAL

## 2021-07-25 PROCEDURE — 04HK3DZ INSERTION OF INTRALUMINAL DEVICE INTO RIGHT FEMORAL ARTERY, PERCUTANEOUS APPROACH: ICD-10-PCS | Performed by: INTERNAL MEDICINE

## 2021-07-25 PROCEDURE — 99291 CRITICAL CARE FIRST HOUR: CPT | Mod: 25 | Performed by: INTERNAL MEDICINE

## 2021-07-25 PROCEDURE — 93306 TTE W/DOPPLER COMPLETE: CPT | Mod: 26 | Performed by: INTERNAL MEDICINE

## 2021-07-25 PROCEDURE — 83605 ASSAY OF LACTIC ACID: CPT | Performed by: INTERNAL MEDICINE

## 2021-07-25 PROCEDURE — 258N000001 HC RX 258

## 2021-07-25 PROCEDURE — 85014 HEMATOCRIT: CPT | Performed by: INTERNAL MEDICINE

## 2021-07-25 PROCEDURE — 86316 IMMUNOASSAY TUMOR OTHER: CPT | Performed by: INTERNAL MEDICINE

## 2021-07-25 PROCEDURE — 93005 ELECTROCARDIOGRAM TRACING: CPT

## 2021-07-25 PROCEDURE — 82947 ASSAY GLUCOSE BLOOD QUANT: CPT | Performed by: INTERNAL MEDICINE

## 2021-07-25 PROCEDURE — 83735 ASSAY OF MAGNESIUM: CPT | Performed by: INTERNAL MEDICINE

## 2021-07-25 PROCEDURE — 250N000009 HC RX 250: Performed by: INTERNAL MEDICINE

## 2021-07-25 PROCEDURE — 93925 LOWER EXTREMITY STUDY: CPT | Mod: 26 | Performed by: RADIOLOGY

## 2021-07-25 PROCEDURE — 84484 ASSAY OF TROPONIN QUANT: CPT | Performed by: INTERNAL MEDICINE

## 2021-07-25 PROCEDURE — 33958 ECMO/ECLS REPOS PERPH CNULA: CPT | Performed by: INTERNAL MEDICINE

## 2021-07-25 PROCEDURE — 86140 C-REACTIVE PROTEIN: CPT | Performed by: INTERNAL MEDICINE

## 2021-07-25 PROCEDURE — 82805 BLOOD GASES W/O2 SATURATION: CPT | Performed by: INTERNAL MEDICINE

## 2021-07-25 PROCEDURE — 93880 EXTRACRANIAL BILAT STUDY: CPT

## 2021-07-25 PROCEDURE — 93925 LOWER EXTREMITY STUDY: CPT

## 2021-07-25 PROCEDURE — 85379 FIBRIN DEGRADATION QUANT: CPT | Performed by: INTERNAL MEDICINE

## 2021-07-25 PROCEDURE — 82803 BLOOD GASES ANY COMBINATION: CPT | Performed by: INTERNAL MEDICINE

## 2021-07-25 PROCEDURE — 95718 EEG PHYS/QHP 2-12 HR W/VEEG: CPT | Mod: GC | Performed by: PSYCHIATRY & NEUROLOGY

## 2021-07-25 PROCEDURE — 93880 EXTRACRANIAL BILAT STUDY: CPT | Mod: 26 | Performed by: RADIOLOGY

## 2021-07-25 PROCEDURE — 85652 RBC SED RATE AUTOMATED: CPT | Performed by: INTERNAL MEDICINE

## 2021-07-25 PROCEDURE — 85610 PROTHROMBIN TIME: CPT | Performed by: INTERNAL MEDICINE

## 2021-07-25 PROCEDURE — 85396 CLOTTING ASSAY WHOLE BLOOD: CPT | Performed by: INTERNAL MEDICINE

## 2021-07-25 PROCEDURE — 82330 ASSAY OF CALCIUM: CPT | Performed by: INTERNAL MEDICINE

## 2021-07-25 PROCEDURE — 74174 CTA ABD&PLVS W/CONTRAST: CPT | Mod: 26 | Performed by: RADIOLOGY

## 2021-07-25 PROCEDURE — 74174 CTA ABD&PLVS W/CONTRAST: CPT

## 2021-07-25 PROCEDURE — 95711 VEEG 2-12 HR UNMONITORED: CPT

## 2021-07-25 PROCEDURE — A7035 POS AIRWAY PRESS HEADGEAR: HCPCS

## 2021-07-25 PROCEDURE — 84100 ASSAY OF PHOSPHORUS: CPT | Performed by: INTERNAL MEDICINE

## 2021-07-25 PROCEDURE — 86920 COMPATIBILITY TEST SPIN: CPT | Performed by: INTERNAL MEDICINE

## 2021-07-25 PROCEDURE — 80053 COMPREHEN METABOLIC PANEL: CPT | Performed by: INTERNAL MEDICINE

## 2021-07-25 PROCEDURE — 84450 TRANSFERASE (AST) (SGOT): CPT | Performed by: INTERNAL MEDICINE

## 2021-07-25 PROCEDURE — 85300 ANTITHROMBIN III ACTIVITY: CPT | Performed by: INTERNAL MEDICINE

## 2021-07-25 PROCEDURE — 94003 VENT MGMT INPAT SUBQ DAY: CPT

## 2021-07-25 PROCEDURE — 84145 PROCALCITONIN (PCT): CPT | Performed by: INTERNAL MEDICINE

## 2021-07-25 PROCEDURE — 85027 COMPLETE CBC AUTOMATED: CPT | Performed by: INTERNAL MEDICINE

## 2021-07-25 PROCEDURE — 250N000011 HC RX IP 250 OP 636: Performed by: INTERNAL MEDICINE

## 2021-07-25 PROCEDURE — 999N000015 HC STATISTIC ARTERIAL MONITORING DAILY

## 2021-07-25 PROCEDURE — 999N000185 HC STATISTIC TRANSPORT TIME EA 15 MIN

## 2021-07-25 PROCEDURE — 250N000013 HC RX MED GY IP 250 OP 250 PS 637: Performed by: INTERNAL MEDICINE

## 2021-07-25 PROCEDURE — C1769 GUIDE WIRE: HCPCS | Performed by: INTERNAL MEDICINE

## 2021-07-25 PROCEDURE — 83615 LACTATE (LD) (LDH) ENZYME: CPT | Performed by: INTERNAL MEDICINE

## 2021-07-25 PROCEDURE — 03PYXDZ REMOVAL OF INTRALUMINAL DEVICE FROM UPPER ARTERY, EXTERNAL APPROACH: ICD-10-PCS | Performed by: INTERNAL MEDICINE

## 2021-07-25 PROCEDURE — 33949 ECMO/ECLS DAILY MGMT ARTERY: CPT

## 2021-07-25 PROCEDURE — P9037 PLATE PHERES LEUKOREDU IRRAD: HCPCS

## 2021-07-25 PROCEDURE — P9016 RBC LEUKOCYTES REDUCED: HCPCS

## 2021-07-25 PROCEDURE — 84155 ASSAY OF PROTEIN SERUM: CPT | Performed by: INTERNAL MEDICINE

## 2021-07-25 PROCEDURE — 85520 HEPARIN ASSAY: CPT | Performed by: INTERNAL MEDICINE

## 2021-07-25 PROCEDURE — 999N000157 HC STATISTIC RCP TIME EA 10 MIN

## 2021-07-25 PROCEDURE — C9113 INJ PANTOPRAZOLE SODIUM, VIA: HCPCS | Performed by: INTERNAL MEDICINE

## 2021-07-25 PROCEDURE — P9012 CRYOPRECIPITATE EACH UNIT: HCPCS

## 2021-07-25 PROCEDURE — 93306 TTE W/DOPPLER COMPLETE: CPT

## 2021-07-25 PROCEDURE — 200N000002 HC R&B ICU UMMC

## 2021-07-25 PROCEDURE — 272N000002 HC OR SUPPLY OTHER OPNP: Performed by: INTERNAL MEDICINE

## 2021-07-25 PROCEDURE — C1894 INTRO/SHEATH, NON-LASER: HCPCS | Performed by: INTERNAL MEDICINE

## 2021-07-25 PROCEDURE — P9059 PLASMA, FRZ BETWEEN 8-24HOUR: HCPCS

## 2021-07-25 PROCEDURE — 85384 FIBRINOGEN ACTIVITY: CPT | Performed by: INTERNAL MEDICINE

## 2021-07-25 RX ORDER — DEXTROSE MONOHYDRATE 25 G/50ML
50 INJECTION, SOLUTION INTRAVENOUS ONCE
Status: COMPLETED | OUTPATIENT
Start: 2021-07-25 | End: 2021-07-25

## 2021-07-25 RX ORDER — DEXTROSE MONOHYDRATE 25 G/50ML
INJECTION, SOLUTION INTRAVENOUS
Status: COMPLETED
Start: 2021-07-25 | End: 2021-07-25

## 2021-07-25 RX ORDER — IOPAMIDOL 755 MG/ML
119 INJECTION, SOLUTION INTRAVASCULAR ONCE
Status: COMPLETED | OUTPATIENT
Start: 2021-07-25 | End: 2021-07-25

## 2021-07-25 RX ORDER — ALBUMIN, HUMAN INJ 5% 5 %
SOLUTION INTRAVENOUS
Status: COMPLETED
Start: 2021-07-25 | End: 2021-07-26

## 2021-07-25 RX ADMIN — PIPERACILLIN SODIUM AND TAZOBACTAM SODIUM 3.38 G: 3; .375 INJECTION, POWDER, LYOPHILIZED, FOR SOLUTION INTRAVENOUS at 10:02

## 2021-07-25 RX ADMIN — PANTOPRAZOLE SODIUM 40 MG: 40 INJECTION, POWDER, FOR SOLUTION INTRAVENOUS at 10:02

## 2021-07-25 RX ADMIN — PIPERACILLIN SODIUM AND TAZOBACTAM SODIUM 3.38 G: 3; .375 INJECTION, POWDER, LYOPHILIZED, FOR SOLUTION INTRAVENOUS at 17:05

## 2021-07-25 RX ADMIN — DEXTROSE MONOHYDRATE 50 ML: 500 INJECTION PARENTERAL at 18:37

## 2021-07-25 RX ADMIN — CALCIUM CHLORIDE 1 G: 100 INJECTION, SOLUTION INTRAVENOUS at 06:53

## 2021-07-25 RX ADMIN — CALCIUM CHLORIDE 1 G: 100 INJECTION, SOLUTION INTRAVENOUS at 17:10

## 2021-07-25 RX ADMIN — MIDAZOLAM (PF) 1 MG/ML IN 0.9 % SODIUM CHLORIDE INTRAVENOUS SOLUTION 5 MG/HR: at 04:12

## 2021-07-25 RX ADMIN — SODIUM BICARBONATE 50 MEQ/HR: 84 INJECTION, SOLUTION INTRAVENOUS at 20:20

## 2021-07-25 RX ADMIN — VANCOMYCIN HYDROCHLORIDE 1500 MG: 100 INJECTION, POWDER, LYOPHILIZED, FOR SOLUTION INTRAVENOUS at 20:20

## 2021-07-25 RX ADMIN — PIPERACILLIN SODIUM AND TAZOBACTAM SODIUM 3.38 G: 3; .375 INJECTION, POWDER, LYOPHILIZED, FOR SOLUTION INTRAVENOUS at 05:08

## 2021-07-25 RX ADMIN — Medication 0.5 MCG/KG/MIN: at 10:15

## 2021-07-25 RX ADMIN — HEPARIN SODIUM 3 ML/HR: 200 INJECTION, SOLUTION INTRAVENOUS at 17:15

## 2021-07-25 RX ADMIN — Medication 4 UNITS/HR: at 07:50

## 2021-07-25 RX ADMIN — TICAGRELOR 90 MG: 90 TABLET ORAL at 20:19

## 2021-07-25 RX ADMIN — DEXTROSE MONOHYDRATE 50 ML: 25 INJECTION, SOLUTION INTRAVENOUS at 18:37

## 2021-07-25 RX ADMIN — SODIUM BICARBONATE 100 MEQ: 84 INJECTION INTRAVENOUS at 13:30

## 2021-07-25 RX ADMIN — IOPAMIDOL 119 ML: 755 INJECTION, SOLUTION INTRAVENOUS at 08:56

## 2021-07-25 RX ADMIN — Medication 0.6 MCG/KG/MIN: at 12:45

## 2021-07-25 RX ADMIN — Medication 0.2 MCG/KG/MIN: at 05:08

## 2021-07-25 RX ADMIN — Medication 4 UNITS/HR: at 16:00

## 2021-07-25 RX ADMIN — SODIUM BICARBONATE 50 MEQ/HR: 84 INJECTION, SOLUTION INTRAVENOUS at 14:13

## 2021-07-25 ASSESSMENT — ACTIVITIES OF DAILY LIVING (ADL)
ADLS_ACUITY_SCORE: 22
ADLS_ACUITY_SCORE: 22
ADLS_ACUITY_SCORE: 18
ADLS_ACUITY_SCORE: 18
ADLS_ACUITY_SCORE: 22
ADLS_ACUITY_SCORE: 22

## 2021-07-25 ASSESSMENT — MIFFLIN-ST. JEOR: SCORE: 1425

## 2021-07-25 NOTE — H&P
Nights:  RIC  Pt needing lots of blood products and fluid resuscitation.  Remains unresponsive on Versed/Fentanyl.  Able to wean pressors Epi and levo down considerably overnight (see Mar).  Pt still losing blood and suspected internal Right groin/perineal.  No large external bleeding noted. SR with PVC's.  Coarse lungs and pt cooled to 34 degrees C.  Urine 50cc/hr. Large amounts of thin brown stool with foul, foul odor.3 liters or more.of stool.  Large hematoma on external right thigh with blistering. Product given included 9 units of PRBCS, 7 units of FFP, 2 units of platelets, 7 Liters of LR.Patient remains on VA ECMO, all equipment is functioning and alarms are appropriately set. RPM's 3200 with flow range 2.15-2.97 L/min. Sweep gas is at 1 LPM and FiO2 50%. Circuit remains free of air, clot and fibrin. Cannulas are secure with no bleeding from site. Extremities are cool and mottled to touch. Suctioned ETT for thick clotted blood.  P  Continue ECMO management.

## 2021-07-25 NOTE — CONSULTS
[facility] PATIENT CONSULTATION NOTE  Consulted by: Cardiac ICU  Reason for consultation: Bleeding around the arterial entry site of an ECMO cannula    HPI:  Martine Ulloa is a 60 year old year old female who has a PMH significant for presenting with STEMI after being found unresponsive.      Stat Vascular Consult placed today.  I saw patient around 11:10am today.  I discussed the case with the nursing team and the cardiac ICU fellow.  The patient had a STEMI s/p PCI intervention (stenting and thrombectomy of proximal to mid RCA) after being found unresponsive by family.  VA ECMO needed post cardiac procedure.  CPR was not initially started by family when found unresponsive but was subsequently started by EMS.  Per chart review CPR duration was approximately 60 minutes.  Length of CPR raises concern for neurologic insult.  Remains unresponsive but is on versed and fentanyl which is sedating.  Currently on multiple pressor agents with SBP in 70s on VA ECMO.  Extremities cool and mottleld.  Has a large retroperitoneal hematoma and large groin hematoma in the subcutaneous tissues overlying the right femoral artery where there is an arterial ECMO catheter in place.  On CTA there is contrast leak around the right femoral artery catheter site.  The leak is coming from where the catheter enters the artery most likely.  The patient required multiple transfusions of product for this large hematoma and bleeding.    Review Of Systems:  Unable to obtain ROS, patient intubated and sedated    PAST MEDICAL HISTORY:  No past medical history on file.    PAST SURGICAL HISTORY:  No past surgical history on file.    FAMILY HISTORY:  No family history on file.    SOCIAL HISTORY:   Social History     Tobacco Use     Smoking status: Not on file   Substance Use Topics     Alcohol use: Not on file       HOME MEDICATIONS:  Prior to Admission medications    Not on File       VITAL SIGNS:  Pulse 105   Temp 97.5  F  "(36.4  C)   Resp 10   Ht 1.626 m (5' 4\")   Wt 88 kg (194 lb 0.1 oz)   SpO2 99%   BMI 33.30 kg/m      Intake/Output Summary (Last 24 hours) at 7/25/2021 1133  Last data filed at 7/25/2021 1100  Gross per 24 hour   Intake 53775.06 ml   Output 2760 ml   Net 34043.06 ml       Labs:  ROUTINE IP LABS (Last four results)  BMP  Recent Labs   Lab 07/25/21  0940 07/25/21  0938 07/25/21  0351 07/25/21  0350 07/24/21  2219 07/24/21  1535 07/24/21  1535   *  --   --  146* 151*  150*  --  146*   POTASSIUM 3.9  --   --  3.9 3.6  3.6  --  4.2  4.2   CHLORIDE 115*  --   --  115* 118*  118*  --  116*   GANESH 7.5*  --   --  7.2* 6.9*  7.0*  --  6.0*   CO2 22  --   --  18* 14*  15*  --  16*   BUN 30  --   --  30 28  28  --  28   CR 1.20*  --   --  1.06* 1.03  1.03  --  1.17*   * 155* 161* 161* 105*  109*  111*   < > 171*  175*    < > = values in this interval not displayed.     CBC  Recent Labs   Lab 07/25/21  0940 07/25/21  0350 07/24/21  2220 07/24/21  1535   WBC 10.5 8.8 13.5* 26.8*  26.8*   RBC 2.57* 2.51* 2.43* 3.24*  3.24*   HGB 7.4* 7.2* 6.5* 7.3*  7.3*   HCT 23.1* 22.1* 20.9* 24.9*  24.9*   MCV 90 88 86 77*  77*   MCH 28.8 28.7 26.7 22.5*  22.5*   MCHC 32.0 32.6 31.1* 29.3*  29.3*   RDW 18.0* 15.8* 17.2* 20.0*  20.0*   PLT 73* 101* 60* 203  203     INR  Recent Labs   Lab 07/25/21  0940 07/25/21  0350 07/24/21  2219 07/24/21  1534   INR 1.75* 1.79* 3.99* 1.66*       PHYSICAL EXAM:  Constitutional: intubated and sedated  Cardiovascular: on VA ECMO  Respiratory: mechanical breath sounds, breath sounds bilateral  Psychiatric: intubated and sedated  Neck: no asymmetry  GI/Abdomen: abdomen soft, large right flank swelling in association with known retro-peritoneal hematoma  MSK: not responding to command  Extremities: no open lesions, extremities cool and mottled, faint AT signal via doppler on left foot, no doppler signals on right foot  Hematology: severe bruising on right hip and groin in " association with known retroperitoneal hematoma and groin hematoma    IMAGING:  CTA:  Impression:     1. Large subcutaneous hematoma overlying the right flank and right hip  with evidence for active arterial extravasation arising from the right  common femoral artery adjacent to the insertion of the right common  femoral arterial catheter.  2. No significant change in the right retroperitoneal hematoma. No  evidence for active retroperitoneal extravasation. Multiple loculated  areas of hematoma/hemorrhage.  3. Colonic wall thickening in the ascending and transverse colon  possibly related to shock bowel versus volume overload. Also,  secondary sign of hyperenhancing adrenal glands on the late arterial  phase suggestive of shock adrenal phenomenon.  4. Small volume intermediate density perihepatic ascites.  5. Smooth interlobular septal thickening and reticular opacities in  the lung bases possibly representing infection versus pulmonary edema.  6. Small right and trace left pleural effusions.  7. Acute minimally displaced right-sided rib fractures.       Patient Active Problem List   Diagnosis     Cardiac arrest (H)       ASSESSMENT:  Stat Vascular Consult placed today.  I saw patient around 11:10am today.  I discussed the case with the nursing team and the cardiac ICU fellow.  The patient had a STEMI s/p PCI intervention (stenting and thrombectomy of proximal to mid RCA) after being found unresponsive by family.  VA ECMO needed post cardiac procedure.  CPR was not initially started by family when found unresponsive but was subsequently started by EMS.  Per chart review CPR duration was approximately 60 minutes.  Length of CPR raises concern for neurologic insult.  Remains unresponsive but is on versed and fentanyl which is sedating.  Currently on multiple pressor agents with SBP in 70s on VA ECMO.  Extremities cool and mottleld.  Has a large retroperitoneal hematoma and large groin hematoma in the subcutaneous  tissues overlying the right femoral artery where there is an arterial ECMO catheter in place.  On CTA there is contrast leak around the right femoral artery catheter site.  The leak is coming from where the catheter enters the artery most likely.  The patient required multiple transfusions of product for this large hematoma and bleeding.    PLAN:  To stop this leaking around the right femoral artery catheter site, likely alternative arterial access for ECMO such as central access would be needed and once obtained the right femoral artery would need to be de-cannulated by the cardiothoracic team and the arterial site closed.     Vascular surgery could come to the OR to assist the decannulation if that was desired by the cardiothoracic surgery team.     Another option would be to attempt to up size the sheath over a wire to prevent further leakage around the catheter entry site.  This would result in temporarily loosing ECMO access and that may be a significant disadvantage in her case given her hemodynamic instability.  Also, it would not stop the leakage with certainty but is just a possible solution.     Again, vascular surgery can be available to assist with a decannulation if desired.     Cased discussed with cardiac ICU team.     Plan of care directed by the Attending Vascular Surgeon, Dr. Stein.     Valentin Ray MD, [facility]  Vascular Surgery Fellow    Discussed pt history, exam, assessment and plan with Dr. Stein of the vascular surgery service, who is in agreement with the above.

## 2021-07-25 NOTE — PROGRESS NOTES
ECMO Shift Summary: 12N      ECMO Equipment:  Console serial number: [id number]  Circuit Lot number: [id number]  Oxygenator Lot number: [id number]      Patient remains on [facility] ECMO, all equipment is functioning and alarms are appropriately set. RPM's 3200 with flow range 2.15-2.97 L/min. Sweep gas is at 1 LPM and FiO2 50%. Circuit remains free of air, clot and fibrin. Cannulas are secure with no bleeding from site. Extremities are cool and mottled to touch. Suctioned ETT for thick clotted blood.    Significant Shift Events: Continuous product given to maintain flow.  Large hematoma with skin blistering on right thigh area.    Vent settings:  Ventilation Mode: PCV Plus assist  (Pressure Control Ventilation/ Assist Control)  FiO2 (%): 60 %  Rate Set (breaths/minute): 10 breaths/min  PEEP (cm H2O): 10 cmH2O  Oxygen Concentration (%): 60 %  Peak Inspiratory Pressure (cm H2O) (Drager Muna): 30  Resp: 10  .    No Heparin, held per MD. ACT range 138-175.    Urine output is decreasing less than 1 ml/kg/hr, blood loss was not visible however large hematoma on external right thigh with blistering. Product given included 9 units of PRBCS, 7 units of FFP, 2 units of platelets, 7 Liters of LR.      Intake/Output Summary (Last 24 hours) at 7/25/2021 0551  Last data filed at 7/25/2021 0500  Gross per 24 hour   Intake 44659.41 ml   Output 2285 ml   Net 71160.41 ml       ECHO:  No results found for this or any previous visit.  No results found for this or any previous visit.      CXR:  Recent Results (from the past 24 hour(s))   Cardiac Catheterization    Narrative    Patient with VT/Vfib Cardiac arrest and Inferior STEMI.  ECMO successfully initiated.  Successful PCI of proximal to mid RCA.  Hypothermia portocol initiated with thermaguard.   CT Head w/o Contrast    Narrative    CT HEAD W/O CONTRAST 7/24/2021 3:31 PM    History: Status post cardiac arrest     Comparison: None.    Technique: Using multidetector thin collimation  helical acquisition  technique, axial, coronal and sagittal CT images from the skull base  to the vertex were obtained without intravenous contrast.   (topogram) image(s) also obtained and reviewed.    Findings: There is no intracranial hemorrhage, mass effect, or midline  shift. Gray/white matter differentiation in both cerebral hemispheres  is decreased, greater on the right. Ventricles are proportionate to  the cerebral sulci. The basal cisterns are clear. Effacement of  cerebral sulci predominantly in low anterior frontal and temporal  lobes, right greater than left.    The bony calvaria and the bones of the skull base are normal. The  visualized portions of the paranasal sinuses and mastoid air cells are  clear.      Impression    Impression:  Decreased gray-white matter differentiation with effacement of  cerebral sulci, concerning for hypoxic ischemic injury with cerebral  edema.    I have personally reviewed the examination and initial interpretation  and I agree with the findings.    LUPE BOOKER MD         SYSTEM ID:  [id number]   CT Chest Abdomen Pelvis w/o Contrast    Addendum: 7/24/2021    Review of the placement of the gastric tube, following reporting of a  radiograph, suggests that the tube is looping back on itself with the  tip pointing superiorly in the region of the gastrojejunostomy  anastomosis. This could be slightly retracted/repositioned depending  on desired tip location.    MARIELA MANCERA MD         SYSTEM ID:  [id number]      Narrative    EXAMINATION: CT CHEST ABDOMEN PELVIS W/O CONTRAST  7/24/2021 3:32 PM      HISTORY: Status post cardiac arrest    COMPARISON:   None available         PROCEDURE COMMENTS: CT of the chest, abdomen, and pelvis was performed  without intravenous contrast. Axial MIP images of the chest, and  coronal and sagittal reformatted images of the chest, abdomen, and  pelvis obtained.    FINDINGS:    Chest:  The endotracheal tube terminates in the midthoracic  trachea. Enteric  tube terminates over the small bowel extending across the  gastrojejunostomy site. Right lower approach central venous catheter  terminates in the upper SVC. Left lower extremity arterial line  terminates over the distal abdominal aorta. Right inferior approach  ECMO catheter tip terminates in the superior intrahepatic IVC. Right  lower extremity arterial line.    Small amount of debris within the dependent portion of the trachea.  Scattered debris and mucus plugging within the right lower and left  lower lobes. Patchy consolidative airspace opacities throughout the  right upper lobe and right lower lobe. Bibasilar dependent  atelectasis. Small right and trace left pleural effusions. Trace  bilateral pneumothoraces. Diffuse intralobular septal thickening.  Patchy opacities overlying the right middle lobe. A few nodular  consolidative opacities in the right lung base (series 4 image 190 and  image 195). Multiple small scattered pulmonary cysts.    The heart size is normal. No pericardial effusion. Severe coronary  artery calcifications. Normal caliber main pulmonary artery and  thoracic aorta conventional 3 vessel branching aortic arch. Mildly  prominent mediastinal lymph nodes, likely reactive. No axillary  lymphadenopathy. Mildly patulous and fluid-filled esophagus.    Abdomen and pelvis:  The liver is normal in appearance on noncontrast imaging. No focal  hepatic mass. Cholecystectomy. There is some dilatation of the common  bile duct and central intrahepatic ducts, likely reservoir effect. No  pancreatic ductal dilatation. Pancreas is atrophic. The adrenal glands  are normal in appearance. Small simple cyst in the inferior pole of  the right kidney. Tiny cystic hypodensities in the superior pole of  the left kidney, likely simple renal cysts. Small amount of retained  contrast within the collecting system bilaterally, likely related to  recent contrast administration. No hydronephrosis or  nephrolithiasis.  Urinary bladder is well-distended and contains excreted contrast. No  pelvic masses. Uterus is within normal limits. Patient is status post  gastric bypass. Prominent stool and air-filled loops of large and  small bowel. Trace free fluid within the pelvis. No free  intraperitoneal air. No pneumatosis or portal venous gas. 6.1 x 5.0 cm  right retroperitoneal hematoma (series 7 image 450). This extends  inferiorly into the pelvis with hematoma along the right pelvic  sidewall, likely involving the iliopsoas muscle, series 7 image 502.  This is difficult to measure at this level. There is some mass effect  on pelvic bowel loops and bladder, deviated towards the left. Slight  stranding along the vessels in the right groin with slightly enlarged  right femoral vein. Vascular patency not evaluated without contrast.  Mild anasarca. Infrarenal aorta is of normal caliber. Aortoiliac  atherosclerotic calcifications. No abdominal or pelvic lymphadenopathy  by size criteria.      Bones:  Multilevel degenerative changes of the spine. Marked disc space  narrowing and mild wedging at T11 and T12. Schmorl's node deformity in  the superior endplate of T10. Nondisplaced fractures of the right 3-7  ribs. Acute fractures of the left ribs 1-8.      Impression    IMPRESSION:  1. Scattered debris within the right lower and left lower lobes with  patchy consolidative opacities throughout the right upper and right  lower lobes, concerning for aspiration/infection.  2. Acute fractures involving right ribs 3-7 and left ribs 1-8 with  tiny bilateral pneumothoraces.  3. Small right and trace left pleural effusions.  4. Moderate right retroperitoneal hematoma extending into the pelvis  along the right pelvic sidewall, likely related to recent  catheterization.  5. Postsurgical changes of gastric bypass with borderline dilated and  fluid-filled loops of large and small, concerning for developing  ileus.  6. Severe coronary  artery calcifications.  7. Patchy consolidation overlying the right middle lobe, likely  representing pulmonary contusion.    Imaging findings discussed with Dr. Ash by Dr. Inderjit Palacios at  4:11PM on 7/24/2021.    I have personally reviewed the examination and initial interpretation  and I agree with the findings.    MARIELA MANCERA MD         SYSTEM ID:  [id number]   XR Chest Port 1 View    Narrative    EXAM: XR CHEST PORT 1 VIEW  7/24/2021 4:16 PM     HISTORY:  Check endotracheal tube placement and ECLS cannula  placement. DO NOT log-roll patient.  Place film under patient using  patient safety handling process.       COMPARISON:  CT 7/24/2021    FINDINGS:   Portable AP view of the supine chest. Left costophrenic angle is out  of view. Endotracheal tube tip projects 0.4 cm above the polly.  Enteric tube tip and its sidehole project over the medial left upper  quadrant. Postsurgical changes in the left upper quadrant from gastric  bypass. Esophageal temperature probe tip projects over distal  esophagus. Partially visualized ECMO cannula with tip terminating in  mid SVC. Surgical clips over left upper quadrant.    Trachea is midline. Cardiomediastinal silhouette appears within normal  limits. Pulmonary vasculature is indistinct. Diffuse hazy opacity  throughout right lung field. Trace right pleural effusion. No  appreciable pneumothorax. Multiple rib fractures bilaterally.      Impression    IMPRESSION:  1. Endotracheal tube tip projects 0.4 cm above the polly.  2. Enteric tube tip and sidehole project over medial left upper  quadrant. Post surgical changes from gastric bypass surgery.  3. Esophageal temperature probe projects over distal esophagus.  4. Partially visualized ECMO cannula with tip in mid SVC.  5. Diffuse hazy opacity throughout the right lung field, concerning  for pulmonary contusion, aspiration/infection.  6. Trace right pleural effusion.  7. Multiple rib fractures bilaterally, please see  same-day CT for  detailed report.    I have personally reviewed the examination and initial interpretation  and I agree with the findings.    MARIELA MANCERA MD         SYSTEM ID:  [id number]   XR Chest Port 1 View    Impression    RESIDENT PRELIMINARY INTERPRETATION  IMPRESSION:   1. No significant change in the diffuse patchy and consolidative  opacities throughout the right lung.  2. Support devices are stable.       Labs:  Recent Labs   Lab 07/25/21  0403 07/25/21  0058 07/24/21  2224 07/24/21 2010   PH 7.21* 7.16* 7.14* 7.22*   PCO2 41 36 39 32*   PO2 152* 113* 150* 144*   HCO3 16* 13* 14* 13*   O2PER 60  60 60 60 60       Lab Results   Component Value Date    HGB 7.2 (L) 07/25/2021    PHGB 30 (H) 07/25/2021     (L) 07/25/2021    FIBR 134 (L) 07/25/2021    INR 1.79 (H) 07/25/2021    PTT 45 (H) 07/25/2021    DD 3.57 (H) 07/25/2021         Plan is remain on VA ECMO and continue rewarming.      Licha Mabry, RRT  ECMO Specialist  7/25/2021 5:51 AM

## 2021-07-25 NOTE — PROGRESS NOTES
Vascular Brief Note:    Stat Vascular Consult placed.  I saw patient around 11:10am today.  I discussed the case with the nursing team and the cardiac ICU fellow.  The patient had a STEMI s/p PCI intervention (stenting and thrombectomy of proximal to mid RCA) after being found unresponsive by family.  VA ECMO needed post cardiac procedure.  CPR was not initially started by family when found unresponsive but was subsequently started by EMS.  Per chart review CPR duration was approximately 60 minutes.  Length of CPR raises concern for neurologic insult.  Remains unresponsive but is on versed and fentanyl which is sedating.  Currently on multiple pressor agents with SBP in 70s on VA ECMO.  Extremities cool and mottleld.  Has a large retroperitoneal hematoma and large groin hematoma in the subcutaneous tissues overlying the right femoral artery where there is an arterial ECMO catheter in place.  On CTA there is contrast leak around the right femoral artery catheter site.  The leak is coming from where the catheter enters the artery most likely.  The patient required multiple transfusions of product for this large hematoma and bleeding.    To stop this leaking around the right femoral artery catheter site, likely alternative arterial access for ECMO such as central access would be needed and once obtained the right femoral artery would need to be de-cannulated by the cardiothoracic team and the arterial site closed.    Vascular surgery could come to the OR to assist the decannulation if that was desired by the cardiothoracic surgery team.    Another option would be to attempt to up size the sheath over a wire to prevent further leakage around the catheter entry site.  This would result in temporarily loosing ECMO access and that may be a significant disadvantage in her case given her hemodynamic instability.  Also, it would not stop the leakage with certainty but is just a possible solution.    Again, vascular surgery  can be available to assist with a decannulation if desired.    Cased discussed with cardiac ICU team.    Plan of care directed by the Attending Vascular Surgeon, Dr. Stein.    Valentin Ray MD, [facility]  Vascular Surgery Fellow

## 2021-07-25 NOTE — PROGRESS NOTES
ECMO Attending Progress Note  2021    Martine Ulloa is a 60 year old female who was cannulated for ECMO 2021 due to refractory VF cardiac arrest    Cannulation Site:  15 Fr in the R femoral artery  25 Fr in the R femoral vein    Interval events: bleeding into the abdominal wall - around arterial cannula on CTA; rewarmed early, multiple blood transfusions    Physical Exam:  Temp:  [92.3  F (33.5  C)-97.9  F (36.6  C)] 97.3  F (36.3  C)  Pulse:  [] 86  Resp:  [10-24] 10  MAP:  [50 mmHg-99 mmHg] 72 mmHg  Arterial Line BP: ()/(37-97) 72/65  FiO2 (%):  [60 %] 60 %  SpO2:  [84 %-100 %] 100 %    Intake/Output Summary (Last 24 hours) at 2021 1800  Last data filed at 2021 1800  Gross per 24 hour   Intake 75092.1 ml   Output 4080 ml   Net 51209.1 ml    Ventilation Mode: PCV Plus assist  (Pressure Control Ventilation/ Assist Control)  FiO2 (%): 60 %  Rate Set (breaths/minute): 10 breaths/min  PEEP (cm H2O): 10 cmH2O  Oxygen Concentration (%): 60 %  Peak Inspiratory Pressure (cm H2O) (Drager Muna): 30  Resp: 10       Labs:  Recent Labs   Lab 21  1618 21  1610 21  1605 21  1220 21  1035 21  0938   PH  --   --  7.22* 7.20* 7.26* 7.02*   PCO2  --   --  40 29* 35 75*   PO2  --   --  302* 363* 501* 124*   HCO3  --   --  16* 11* 16* 19*   O2PER 70 60 60 60 60 60      Recent Labs   Lab 21  1606 21  0940 21  0350 21  2220   WBC 9.2 10.5 8.8 13.5*   HGB 6.2* 7.4* 7.2* 6.5*     Creatinine   Date Value Ref Range Status   2021 1.20 (H) 0.52 - 1.04 mg/dL Final   2021 1.06 (H) 0.52 - 1.04 mg/dL Final   2021 1.03 0.52 - 1.04 mg/dL Final   2021 1.03 0.52 - 1.04 mg/dL Final     Blood Flow (Circuit) LPM: 3.01 LPM (post new 17 fr cannula)  Gas Flow  LPM: 3 LPM  Gas FiO2   %: (S) 50 %  ACT  (seconds): 138 seconds  Blood Temp  (degrees C): 33.4 C  Arterial Pressure  mmH mmHg    ECMO Issues including assessments and  plan on DOS 7/25/2021:  Neuro: Sedated for mechanical ventilation and ECMO.  No acute distress.  NIRS stable b/l  RASS goal: -3  CV: Cardiogenic shock.  Hemodynamically stable on multiple pressors  Pulm: Keep vent settings at rest settings as above.  FEN/Renal: Electrolytes stable w/ replacement protocols in place, Cr worsening, UOP decreasing  Heme: ACT goal: 140-160, Hemoglobin decreasing despite transfusions .  Minimal oozing around the ECMO cannulas.  ID: Receiving empiric antibiotics  Cannulae: Position is acceptable on exam and the available imaging.  Distal perfusion cannula is in place and patent.  Extremities are well-perfused.     I have personally reviewed the ECMO flows, oxygenation and CO2 clearance, anticoagulation, and cannula position.  I have also personally assessed the patient's systemic response with hemodynamics, oxygenation, ventilation, and bleeding.       The patient requires continued ECMO support and management in the ICU.  I have discussed patient care and treatment plan with the primary team.      Karlos Love MD, PhD  Interventional/Critical Care Cardiology  [phone]    July 25, 2021

## 2021-07-25 NOTE — PROGRESS NOTES
Critical Care Cardiology   Progress Note  Martine Ulloa MRN: [id number]  Age: 60 year old, : 1960    History of Present Illness     Ms Karen Jacobsen is a  60 year old female being admitted on 2021. The patient has a PMHx of multiple sclerosis and sarcoidosis (unclear treatment details). The patient presented on 2021 as a VFib ECMO activation. She was found to have inferior STEMI with 100% occlusion of Ost RCA to Dist RCA lesion 100% stenoses (type C- high risk lesion), status post PCI (stenting and thrombectomy) of proximal to mid RCA, DAVIAN 3 flow obtained. Patient transferred to the CICU for further management of cardiac arrest s/p VA ECMO cannulation.     Subjective/Interval Events     Overnight on 2021, patient noted to have escalating pressor requirements (max dose- epi, vaso, nor-epinephrine); along with multiple blood products (11 U PRBC, 7 FFP, LR 8L, 2U PLT). CTA done this AM showed a large subcutaneous hematoma overlying the right hip measuring  approximately 16.4 x 7 cm; with evidence for active arterial extravasation arising from the right  common femoral artery adjacent to the cannula. Vascular surgery consulted, to assess patient, recommend consideration of upsizing cannula vs alternative ECMO access site w/ surgical closure of the current one.    In addition, colonic wall thickening in the ascending and transverse colon possibly related to ischemic bowel versus volume overload.     CT head revealed decreased gray-white matter differentiation with effacement of cerebral sulci, concerning for hypoxic ischemic injury with cerebral edema. NCC on board for assistance. Unfortunately, the hypothermia protocol had to be aborted due to severe bleeding; rewarming initiated this morning.    Family dynamic w/ aunt slightly difficult- daughter Nataly/Bryon updated bedside.    Assessment and Plan     Today's plan:  - Resuscitation as required for hemorrhagic shock.   - Will  upsize the R arterial ECMO cannula in CCL to stop bleeding.    - Start vEEG monitoring.   - Start bicarb gtt given metabolic acidosis.     ------------------  Neurology:   # Concern for ischemic brain injury  Fixed pupils at midposition on light examination, reportedly reactive overnight to pupillometer  Hypothermia portocol initiated with thermaguard: cooled to 34 degrees y'day- however, had to be rewarmed due to severe bleeding this morning.     Imaging/procedures:   CTH: 7/24/2021- decreased gray-white matter differentiation with effacement of cerebral sulci, concerning for hypoxic ischemic injury with cerebral edema.  EEG: ordered     Sedation/paralytics:   Versed @ 5mg/hr, Fentanyl @ 50 mcg/hr.     Plan:   - Continue versed, fentanyl as needed for sedation.  - vEEG monitoring.  - Follow up CTH in 72 hours, pending clinical course.   - Mannitol not an option for cerebral edema due to hypotension; hypernatremia not   - Neurocritical Care on board, appreciate recs.       Cardiovascular:      # VFib cardiac arrest  # Inferior STEMI s/p MIC to prox-distal RCA  # Lactic acidosis  # Cardiogenic shock     Refractory VF arrest s/p peripheral VA ECMO insertion. Initial LA 10.      MCS: VA ECMO- flow only 2LPM due to active bleeding.    Imaging/procedures:   Coronary angiogram: 100% occlusion of Ost RCA to Dist RCA lesion 100% stenoses (type C- high risk lesion), status post PCI (stenting and thrombectomy) of proximal to mid RCA; LAD & LCx patent.  TTE: The visual ejection fraction is 15-20%. Global right ventricular function is severely reduced.     Vasoactive/antiarrhythmic infusions:   Epi @ 0.3, Levo @0.6, Vaso @ 4, Lars @ 2.     Plan:  - Upsizing the R arterial ECMO cannula in the CCL to stop bleeding.   - Resuscitation for bleeding as required.  - Drawing ABGs from RRA.   - ASA and ticagrelor on hold today due to bleeding.  - hold ACE/ARB given likely reduced renal fxn after arrest  - hold beta blocker given shock    - hold statin given likely hepatic injury during arrest     Pulmonary:  # Acute hypoxic resp failure     Ventilation Mode: PCV Plus assist  (Pressure Control Ventilation/ Assist Control)  FiO2 (%): 60 %  Rate Set (breaths/minute): 10 breaths/min  PEEP (cm H2O): 10 cmH2O  Oxygen Concentration (%): 50 %  Peak Inspiratory Pressure (cm H2O) (Drager Muna): 30  Resp: 10     Imaging/procedures:   CT chest: Scattered debris within the right lower and left lower lobes with patchy consolidative opacities throughout the right upper and right lower lobes, concerning for aspiration/infection. Acute fractures involving right ribs 3-7 and left ribs 1-8.     Plan:   -wean vent as able  -daily CXR  -Q2h ABGs for now  -consider scheduled duonebs if signs of lung dz, currently PRN      GI and Nutrition:   # NPO   # Concern for Ischemic hepatitis:      Imaging/procedures:   CT abd/pelvis: Moderate right groin hematoma extending into the pelvis along the right pelvic sidewall, likely related to recent catheterization. Postsurgical changes of gastric bypass with borderline dilated and fluid-filled loops of large and small, concerning for developing ileus.     Plan:   - Monitor BID LFTs.  - NPO for now.  - Bowel regimen - on hold for now.  - GI prophylaxis: Protonix 40 IV daily.      Renal, Fluid, and Electrolytes:   # Monitor for MACARENA     Plan:   -monitor urine output  -maintain K>3 and Mg>2     Infectious Disease:   # Aspiration pneumonia  Blood/sputum cultures collected.   Plan:   -vancomycin/zosyn x5 days for aspiration pneumonia   -daily blood cultures while on ECMO   -monitor for signs of infection given cooling, lines, and leukocytosis     Hematology and Oncology:   # Anemia (?ACD vs MISA)  # Large R groin hematoma w/ active CFA     Heparin and ASA/ticagrelor on hold today due to bleeding.     Plan:   -upsize ECMO cannula to stop R CFA bleeding.  -cryo PRN for fibrinogen < 200; FFP for INR >2  -transfuse for hgb <8  -US LE w/  "arterial duplex per ECMO protocol   -DVT prophylaxis: heparin gtt     Endocrine:   No known medical history. BG elevated in setting of critical illness.  Plan:   -insulin gtt if needed.      Lines:   R femoral arterial and venous ECMO cannulae July 24, 2021  L femoral arterial sheath July 24, 2021  L femoral Thermoguard July 24, 2021  R radial arterial line July 24, 2021  ETT July 24, 2021  Aranda catheter July 24, 2021  OG tube July 24, 2021     Current lines are required for patient management      Family update by me today: Yes      Code Status: Full     The pt was discussed and evaluated with Karlos Alegria MD, attending physician, who agrees with the assessment and plan above.      Donna Ash MD,   Cardiovascular Disease Fellow  Pager [phone]       Objective     Pulse 104   Temp (!) 96.1  F (35.6  C)   Resp 10   Ht 1.626 m (5' 4\")   Wt 88 kg (194 lb 0.1 oz)   SpO2 99%   BMI 33.30 kg/m    Temp:  [88.9  F (31.6  C)-96.1  F (35.6  C)] 96.1  F (35.6  C)  Pulse:  [] 104  Resp:  [10-25] 10  MAP:  [50 mmHg-97 mmHg] 83 mmHg  Arterial Line BP: ()/(31-91) 86/81  FiO2 (%):  [60 %-100 %] 60 %  SpO2:  [84 %-100 %] 99 %  Wt Readings from Last 2 Encounters:   07/25/21 88 kg (194 lb 0.1 oz)     I/O last 3 completed shifts:  In: 76759.51 [I.V.:8391.51; NG/GT:30; IV Piggyback:3000]  Out: 2385 [Urine:885; Stool:1500]    GENERAL APPEARANCE: Intubated, sedated. NAD.  HEENT: No icterus, PERRL 2 mm, ETT in place, OG tube in place.  CARDIOVASCULAR: Regular rate and rhythm, normal S1/S2, no S3/S4 and no murmur, click or rub. Normal PMI. DP Pulses dopplerable.  RESP: Coarse bilaterally. Mechanical ventilation.    GASTRO: Soft, bowel sounds hypoactive but present.   GENITOURINARY: Aranda in place.  EXTREMITIES: Large groin hematoma in the subcutaneous tissues overlying the right fCFA, extending posteriorly. Overlying skin firm, distended, bruised and pressure blisters noted (bursting w/ blood extravasation). "   Cool, DP pulses dopplered.   NEURO: Sedated and intubated, pupils fixed and dilated.   INTEGUMENTARY: No rashes. Cannula/Line sites CDI     Data     Vent Settings:  Resp: 10 SpO2: 99 % O2 Device: Mechanical Ventilator      Arterial Blood Gas:   Recent Labs   Lab 07/25/21  0938 07/25/21  0627 07/25/21  0403 07/25/21  0058   PH 7.02* 7.30* 7.21* 7.16*   PCO2 75* 36 41 36   PO2 124* 151* 152* 113*   HCO3 19* 18* 16* 13*   O2PER 60 60 60  60 60       Vitals:    07/24/21 1540 07/25/21 0500   Weight: 80 kg (176 lb 5.9 oz) 88 kg (194 lb 0.1 oz)   I/O last 3 completed shifts:  In: 92940.51 [I.V.:8391.51; NG/GT:30; IV Piggyback:3000]  Out: 2385 [Urine:885; Stool:1500]  Recent Labs   Lab 07/25/21  0940 07/25/21  0938 07/25/21  0350   *  --  146*   POTASSIUM 3.9  --  3.9   CHLORIDE 115*  --  115*   CO2 22  --  18*   ANIONGAP 10  --  13   * 155* 161*   BUN 30  --  30   CR 1.20*  --  1.06*   GANESH 7.5*  --  7.2*     No components found for: URINE   Recent Labs   Lab 07/25/21  0940 07/25/21  0350 07/24/21  2219   * 215* 218*   ALT 63* 55* 48   BILITOTAL 0.7 1.0 0.9   ALBUMIN 1.5* 1.8* 1.1*   PROTTOTAL 3.0* 3.4* 1.6*   ALKPHOS 40 51 43     Temp: (!) 96.1  F (35.6  C) Temp src: EsophagealTemp  Min: 88.9  F (31.6  C)  Max: 96.1  F (35.6  C)   Recent Labs   Lab 07/25/21  0940 07/25/21  0350 07/24/21  2220 07/24/21  1535 07/24/21  1348   WBC 10.5 8.8 13.5* 26.8*  26.8*  --    HGB 7.4* 7.2* 6.5* 7.3*  7.3* 7.7*   HCT 23.1* 22.1* 20.9* 24.9*  24.9*  --    MCV 90 88 86 77*  77*  --    RDW 18.0* 15.8* 17.2* 20.0*  20.0*  --    PLT 73* 101* 60* 203  203  --      Recent Labs   Lab 07/25/21  0940 07/25/21  0350 07/24/21  2219 07/24/21  1534   INR 1.75* 1.79* 3.99* 1.66*   PTT 55* 45* 171* >240*     Recent Labs   Lab 07/25/21  0940 07/25/21  0938 07/25/21  0351 07/25/21  0350 07/24/21  2318   * 155* 161* 161* 120*       All imaging personally reviewed:  Recent Results (from the past 24 hour(s))   Cardiac  Catheterization    Narrative    Patient with VT/Vfib Cardiac arrest and Inferior STEMI.  ECMO successfully initiated.  Successful PCI of proximal to mid RCA.  Hypothermia portocol initiated with thermaguard.   CT Head w/o Contrast    Narrative    CT HEAD W/O CONTRAST 7/24/2021 3:31 PM    History: Status post cardiac arrest     Comparison: None.    Technique: Using multidetector thin collimation helical acquisition  technique, axial, coronal and sagittal CT images from the skull base  to the vertex were obtained without intravenous contrast.   (topogram) image(s) also obtained and reviewed.    Findings: There is no intracranial hemorrhage, mass effect, or midline  shift. Gray/white matter differentiation in both cerebral hemispheres  is decreased, greater on the right. Ventricles are proportionate to  the cerebral sulci. The basal cisterns are clear. Effacement of  cerebral sulci predominantly in low anterior frontal and temporal  lobes, right greater than left.    The bony calvaria and the bones of the skull base are normal. The  visualized portions of the paranasal sinuses and mastoid air cells are  clear.      Impression    Impression:  Decreased gray-white matter differentiation with effacement of  cerebral sulci, concerning for hypoxic ischemic injury with cerebral  edema.    I have personally reviewed the examination and initial interpretation  and I agree with the findings.    LUPE BOOKER MD         SYSTEM ID:  [id number]   CT Chest Abdomen Pelvis w/o Contrast    Addendum: 7/24/2021    Review of the placement of the gastric tube, following reporting of a  radiograph, suggests that the tube is looping back on itself with the  tip pointing superiorly in the region of the gastrojejunostomy  anastomosis. This could be slightly retracted/repositioned depending  on desired tip location.    MARIELA MANCERA MD         SYSTEM ID:  [id number]      Narrative    EXAMINATION: CT CHEST ABDOMEN PELVIS W/O CONTRAST   7/24/2021 3:32 PM      HISTORY: Status post cardiac arrest    COMPARISON:   None available         PROCEDURE COMMENTS: CT of the chest, abdomen, and pelvis was performed  without intravenous contrast. Axial MIP images of the chest, and  coronal and sagittal reformatted images of the chest, abdomen, and  pelvis obtained.    FINDINGS:    Chest:  The endotracheal tube terminates in the midthoracic trachea. Enteric  tube terminates over the small bowel extending across the  gastrojejunostomy site. Right lower approach central venous catheter  terminates in the upper SVC. Left lower extremity arterial line  terminates over the distal abdominal aorta. Right inferior approach  ECMO catheter tip terminates in the superior intrahepatic IVC. Right  lower extremity arterial line.    Small amount of debris within the dependent portion of the trachea.  Scattered debris and mucus plugging within the right lower and left  lower lobes. Patchy consolidative airspace opacities throughout the  right upper lobe and right lower lobe. Bibasilar dependent  atelectasis. Small right and trace left pleural effusions. Trace  bilateral pneumothoraces. Diffuse intralobular septal thickening.  Patchy opacities overlying the right middle lobe. A few nodular  consolidative opacities in the right lung base (series 4 image 190 and  image 195). Multiple small scattered pulmonary cysts.    The heart size is normal. No pericardial effusion. Severe coronary  artery calcifications. Normal caliber main pulmonary artery and  thoracic aorta conventional 3 vessel branching aortic arch. Mildly  prominent mediastinal lymph nodes, likely reactive. No axillary  lymphadenopathy. Mildly patulous and fluid-filled esophagus.    Abdomen and pelvis:  The liver is normal in appearance on noncontrast imaging. No focal  hepatic mass. Cholecystectomy. There is some dilatation of the common  bile duct and central intrahepatic ducts, likely reservoir effect. No  pancreatic  ductal dilatation. Pancreas is atrophic. The adrenal glands  are normal in appearance. Small simple cyst in the inferior pole of  the right kidney. Tiny cystic hypodensities in the superior pole of  the left kidney, likely simple renal cysts. Small amount of retained  contrast within the collecting system bilaterally, likely related to  recent contrast administration. No hydronephrosis or nephrolithiasis.  Urinary bladder is well-distended and contains excreted contrast. No  pelvic masses. Uterus is within normal limits. Patient is status post  gastric bypass. Prominent stool and air-filled loops of large and  small bowel. Trace free fluid within the pelvis. No free  intraperitoneal air. No pneumatosis or portal venous gas. 6.1 x 5.0 cm  right retroperitoneal hematoma (series 7 image 450). This extends  inferiorly into the pelvis with hematoma along the right pelvic  sidewall, likely involving the iliopsoas muscle, series 7 image 502.  This is difficult to measure at this level. There is some mass effect  on pelvic bowel loops and bladder, deviated towards the left. Slight  stranding along the vessels in the right groin with slightly enlarged  right femoral vein. Vascular patency not evaluated without contrast.  Mild anasarca. Infrarenal aorta is of normal caliber. Aortoiliac  atherosclerotic calcifications. No abdominal or pelvic lymphadenopathy  by size criteria.      Bones:  Multilevel degenerative changes of the spine. Marked disc space  narrowing and mild wedging at T11 and T12. Schmorl's node deformity in  the superior endplate of T10. Nondisplaced fractures of the right 3-7  ribs. Acute fractures of the left ribs 1-8.      Impression    IMPRESSION:  1. Scattered debris within the right lower and left lower lobes with  patchy consolidative opacities throughout the right upper and right  lower lobes, concerning for aspiration/infection.  2. Acute fractures involving right ribs 3-7 and left ribs 1-8 with  tiny  bilateral pneumothoraces.  3. Small right and trace left pleural effusions.  4. Moderate right retroperitoneal hematoma extending into the pelvis  along the right pelvic sidewall, likely related to recent  catheterization.  5. Postsurgical changes of gastric bypass with borderline dilated and  fluid-filled loops of large and small, concerning for developing  ileus.  6. Severe coronary artery calcifications.  7. Patchy consolidation overlying the right middle lobe, likely  representing pulmonary contusion.    Imaging findings discussed with Dr. Ash by Dr. Inderjit Palacios at  4:11PM on 7/24/2021.    I have personally reviewed the examination and initial interpretation  and I agree with the findings.    MARIELA MANCERA MD         SYSTEM ID:  [id number]   XR Chest Port 1 View    Narrative    EXAM: XR CHEST PORT 1 VIEW  7/24/2021 4:16 PM     HISTORY:  Check endotracheal tube placement and ECLS cannula  placement. DO NOT log-roll patient.  Place film under patient using  patient safety handling process.       COMPARISON:  CT 7/24/2021    FINDINGS:   Portable AP view of the supine chest. Left costophrenic angle is out  of view. Endotracheal tube tip projects 0.4 cm above the polly.  Enteric tube tip and its sidehole project over the medial left upper  quadrant. Postsurgical changes in the left upper quadrant from gastric  bypass. Esophageal temperature probe tip projects over distal  esophagus. Partially visualized ECMO cannula with tip terminating in  mid SVC. Surgical clips over left upper quadrant.    Trachea is midline. Cardiomediastinal silhouette appears within normal  limits. Pulmonary vasculature is indistinct. Diffuse hazy opacity  throughout right lung field. Trace right pleural effusion. No  appreciable pneumothorax. Multiple rib fractures bilaterally.      Impression    IMPRESSION:  1. Endotracheal tube tip projects 0.4 cm above the polly.  2. Enteric tube tip and sidehole project over medial left  upper  quadrant. Post surgical changes from gastric bypass surgery.  3. Esophageal temperature probe projects over distal esophagus.  4. Partially visualized ECMO cannula with tip in mid SVC.  5. Diffuse hazy opacity throughout the right lung field, concerning  for pulmonary contusion, aspiration/infection.  6. Trace right pleural effusion.  7. Multiple rib fractures bilaterally, please see same-day CT for  detailed report.    I have personally reviewed the examination and initial interpretation  and I agree with the findings.    MARIELA MANCERA MD         SYSTEM ID:  [id number]   XR Chest Port 1 View    Impression    RESIDENT PRELIMINARY INTERPRETATION  IMPRESSION:   1. No significant change in the diffuse patchy and consolidative  opacities throughout the right lung.  2. Support devices are stable.   CTA Abdomen Pelvis with Contrast   Result Value    Radiologist flags (Urgent)     Active subcutaneous bleed adjacent to the right common    Narrative    Exam: Computed tomographic angiography of the abdomen and pelvis  without and with contrast, including 3D reformations dated 7/25/2021  9:13 AM    Clinical information:  History of retroperitoneal bleed on ECMO.  Required greater than 20 minutes blood products overnight. Concern for  active bleed and worsening hematoma    Technique: Helical scans through the abdomen and pelvis obtained  before the administration of intravenous contrast media and following  the injection of contrast media  in the arterial phase. Source images  reviewed as well as 3D and multi-planar reconstructions.    Contrast: iopamidol (ISOVUE-370) solution 119 mL       DLP: mGy*cm    Comparison study:    Findings:     Large subcutaneous hematoma overlying the right hip measuring  approximately 16.4 x 7 cm. The complete extent of this hematoma is not  entirely included in the field-of-view due to patient body habitus.  There is a additional possibly smaller loculated subcutaneous  hematoma  overlying the anterior right lower quadrant measuring approximately  6.6 x 6.7 cm. There are multiple fluid density abnormalities with rim  hyperdense foci, consistent with probable loculated components of  hemorrhage. On the late arterial phase imaging there is a focal bright  blush of contrast adjacent to the right common femoral arterial  catheter (series 9, image 405-428). Grossly unchanged appearance of  the right retroperitoneal hematoma extending along the right flank to  the right pelvic sidewall. Small intermediate density perihepatic  ascites. 4.5 cm intraluminal catheter in the left lower quadrant  possibly representing a migrated biliary stent (series 3, image 46).  Diffuse anasarca with asymmetrically edematous right lower extremity  and right flank subcutaneous tissues due to the associated hematoma.    Left femoral arterial approach catheter with tip in the infrarenal  abdominal aorta. A left femoral venous approach central venous  catheter with tip at the infrahepatic IVC. Enteric tube within the  stomach. Postoperative changes of Angely-en-Y gastric bypass. Rectal  tube and urinary catheter in place.  Edematous colon most pronounced in the ascending and transverse colon.  No pneumatosis, portal venous gas, or intra-abdominal free air.    Small right and trace left pleural effusions with associated  compressive atelectasis at the lung bases. Smooth interlobular septal  thickening with reticular groundglass opacities at the lung bases and  right middle lobe, possibly representing pulmonary edema versus  infection. Findings suggesting possible hematoma involvement of the  bilateral chest wall subcutaneous tissues as well with hyperdense  tissues adjacent to the chest wall musculature.    Cholecystectomy. The spleen is unremarkable. Bilateral adrenal glands  show prominent enhancement on the late arterial phase which is  suggestive of hypovolemia/shock adrenal as well. Unchanged  simple  exophytic cyst in the interpolar region of the right kidney. No  hydronephrosis or renal stone.    Partial visualization of multiple right-sided rib fractures.    The abdominal aorta is normal in caliber.    The celiac axis, SMA and PAMELA are patent. The renal arteries are patent  bilaterally.    The splenic vein, portal vein, SMV and renal veins are  patent.  The hepatic veins and IVC are patent.      Impression    Impression:    1. Large subcutaneous hematoma overlying the right flank and right hip  with evidence for active arterial extravasation arising from the right  common femoral artery adjacent to the insertion of the right common  femoral arterial catheter.  2. No significant change in the right retroperitoneal hematoma. No  evidence for active retroperitoneal extravasation. Multiple loculated  areas of hematoma/hemorrhage.  3. Colonic wall thickening in the ascending and transverse colon  possibly related to shock bowel versus volume overload. Also,  secondary sign of hyperenhancing adrenal glands on the late arterial  phase suggestive of shock adrenal phenomenon.  4. Small volume intermediate density perihepatic ascites.  5. Smooth interlobular septal thickening and reticular opacities in  the lung bases possibly representing infection versus pulmonary edema.  6. Small right and trace left pleural effusions.  7. Acute minimally displaced right-sided rib fractures.      [Urgent Result: Active subcutaneous bleed adjacent to the right common  femoral arterial catheter]    Finding was identified on 7/25/2021 8:36 AM.     Dr. Mohr was contacted by Dr. Silver at 7/25/2021 9:00 AM and  verbalized understanding of the urgent finding.     I have personally reviewed the examination and initial interpretation  and I agree with the findings.    MARCUS WITT MD         SYSTEM ID:  [id number]          Medications     Current Facility-Administered Medications   Medication     artificial tears ophthalmic  ointment     aspirin (ASA) chewable tablet 81 mg     calcium chloride in  mL intermittent infusion 1 g     EPINEPHrine (ADRENALIN) 16 mg in sodium chloride 0.9 % 250 mL infusion     fentaNYL (SUBLIMAZE) 50 mcg/mL bolus from infusion pump 50 mcg     fentaNYL (SUBLIMAZE) infusion     [Held by provider] heparin (porcine) 100 unit/mL in 0.45% Sodium Chloride ANTICOAGULANT infusion     heparin 2 unit/mL in 0.9% NaCl (for REPERFUSION CATHETER)     [Held by provider] heparin ANTICOAGULANT bolus dose from infusion pump 273.5-547 Units     lidocaine (LMX4) cream     lidocaine 1 % 0.1-1 mL     magnesium sulfate 2 g in water intermittent infusion     magnesium sulfate 4 g in 100 mL sterile water (premade)     Medication Considerations - To maintain platelet inhibition after discontinuation of cangrelor (KENGREAL) [see admin instructions]     midazolam (VERSED) drip - ADULT 100 mg/100 mL in NS (pre-mix)     midazolam (VERSED) injection 1-3 mg     naloxone (NARCAN) injection 0.2 mg    Or     naloxone (NARCAN) injection 0.4 mg    Or     naloxone (NARCAN) injection 0.2 mg    Or     naloxone (NARCAN) injection 0.4 mg     norepinephrine (LEVOPHED) 16 mg in  mL infusion MAX CONC CENTRAL LINE     pantoprazole (PROTONIX) IV push injection 40 mg     phenylephrine (ALEJANDRINA-SYNEPHRINE) 50 mg in NaCl 0.9 % 250 mL infusion     piperacillin-tazobactam (ZOSYN) 3.375 g vial to attach to  mL bag     potassium chloride 20 mEq in 50 mL intermittent infusion     propofol (DIPRIVAN) infusion     sodium chloride (PF) 0.9% PF flush 3 mL     sodium chloride (PF) 0.9% PF flush 3 mL     sodium phosphate (NaPHOS) 15 mMol in 250 mL D5W PREMADE infusion     sodium phosphate 10 mmol in sodium chloride 0.9 % intermittent infusion     sodium phosphate 20 mmol in sodium chloride 0.9 % intermittent infusion     sodium phosphate 25 mmol in sodium chloride 0.9 % intermittent infusion     ticagrelor (BRILINTA) tablet 90 mg     vancomycin 1500 mg in  0.9% NaCl 250 ml intermittent infusion 1,500 mg     vasopressin 1 unit/mL MAX Conc (PITRESSIN) infusion

## 2021-07-25 NOTE — PLAN OF CARE
Admitted/transferred from: Cath Lab  Reason for admission/transfer: [facility] ECMO  2 RN skin assessment: completed by Inderjit DECKER And Carito HUERTA  Result of skin assessment and interventions/actions: No major skin issues noted.  Height, weight, drug calc weight: Not Done  Patient belongings (see Flowsheet)  MDRO education added to care planN/A      Major Shift Events: To 4A from Cath Lab at 1530. ECMO chugging consistently with low MAPs- gave total of 3L LR, 1L Albumin and 2u PRBCs. On high pressor requirements.   Plan: Continue to monitor. Sister updated via phone.   For vital signs and complete assessments, please see documentation flowsheets.   ?

## 2021-07-26 ENCOUNTER — APPOINTMENT (OUTPATIENT)
Dept: GENERAL RADIOLOGY | Facility: CLINIC | Age: 61
End: 2021-07-26
Attending: INTERNAL MEDICINE
Payer: MEDICAID

## 2021-07-26 ENCOUNTER — APPOINTMENT (OUTPATIENT)
Dept: CT IMAGING | Facility: CLINIC | Age: 61
End: 2021-07-26
Attending: INTERNAL MEDICINE
Payer: MEDICAID

## 2021-07-26 ENCOUNTER — APPOINTMENT (OUTPATIENT)
Dept: NEUROLOGY | Facility: CLINIC | Age: 61
End: 2021-07-26
Attending: INTERNAL MEDICINE
Payer: MEDICAID

## 2021-07-26 VITALS
TEMPERATURE: 98.2 F | RESPIRATION RATE: 10 BRPM | OXYGEN SATURATION: 94 % | HEIGHT: 64 IN | WEIGHT: 205.03 LBS | BODY MASS INDEX: 35 KG/M2

## 2021-07-26 PROBLEM — N17.9 ACUTE KIDNEY FAILURE, UNSPECIFIED (H): Status: ACTIVE | Noted: 2021-07-26

## 2021-07-26 LAB
ACT BLD: 159 SECONDS (ref 74–150)
ACT BLD: 171 SECONDS (ref 74–150)
ALBUMIN SERPL-MCNC: 1.3 G/DL (ref 3.4–5)
ALBUMIN SERPL-MCNC: 1.3 G/DL (ref 3.4–5)
ALP SERPL-CCNC: 36 U/L (ref 40–150)
ALP SERPL-CCNC: 44 U/L (ref 40–150)
ALT SERPL W P-5'-P-CCNC: 340 U/L (ref 0–50)
ALT SERPL W P-5'-P-CCNC: 545 U/L (ref 0–50)
ANION GAP SERPL CALCULATED.3IONS-SCNC: 10 MMOL/L (ref 3–14)
ANION GAP SERPL CALCULATED.3IONS-SCNC: 9 MMOL/L (ref 3–14)
APTT PPP: 47 SECONDS (ref 22–38)
AST SERPL W P-5'-P-CCNC: 1035 U/L (ref 0–45)
AST SERPL W P-5'-P-CCNC: 1350 U/L (ref 0–45)
AT III ACT/NOR PPP CHRO: 34 % (ref 85–135)
ATRIAL RATE - MUSE: 73 BPM
ATRIAL RATE - MUSE: 97 BPM
BASE EXCESS BLDA CALC-SCNC: -1.2 MMOL/L (ref -9–1.8)
BASE EXCESS BLDA CALC-SCNC: -1.2 MMOL/L (ref -9–1.8)
BASE EXCESS BLDA CALC-SCNC: -3 MMOL/L (ref -9–1.8)
BASE EXCESS BLDA CALC-SCNC: 0.8 MMOL/L (ref -9–1.8)
BASE EXCESS BLDA CALC-SCNC: 1.2 MMOL/L (ref -9–1.8)
BASE EXCESS BLDA CALC-SCNC: 1.2 MMOL/L (ref -9–1.8)
BASE EXCESS BLDA CALC-SCNC: 2.4 MMOL/L (ref -9–1.8)
BASE EXCESS BLDA CALC-SCNC: 4.1 MMOL/L (ref -9–1.8)
BASE EXCESS BLDV CALC-SCNC: 1.1 MMOL/L (ref -7.7–1.9)
BILIRUB SERPL-MCNC: 1.7 MG/DL (ref 0.2–1.3)
BILIRUB SERPL-MCNC: 2.2 MG/DL (ref 0.2–1.3)
BLD PROD TYP BPU: NORMAL
BLOOD COMPONENT TYPE: NORMAL
BUN SERPL-MCNC: 36 MG/DL (ref 7–30)
BUN SERPL-MCNC: 38 MG/DL (ref 7–30)
CA-I BLD-MCNC: 4 MG/DL (ref 4.4–5.2)
CA-I BLD-MCNC: 4.1 MG/DL (ref 4.4–5.2)
CALCIUM SERPL-MCNC: 6.6 MG/DL (ref 8.5–10.1)
CALCIUM SERPL-MCNC: 7 MG/DL (ref 8.5–10.1)
CF REDUC 30M P MA P HEP LENFR BLD TEG: 0 % (ref 0–8)
CF REDUC 60M P MA P HEPASE LENFR BLD TEG: 0.4 % (ref 0–15)
CFT P HPASE BLD TEG: 2.1 MINUTE (ref 1–3)
CHLORIDE BLD-SCNC: 118 MMOL/L (ref 94–109)
CHLORIDE BLD-SCNC: 121 MMOL/L (ref 94–109)
CI (COAGULATION INDEX)(Z): -3.1 (ref -3–3)
CLOT ANGLE P HPASE BLD TEG: 57.9 DEGREES (ref 53–72)
CLOT INIT P HPASE BLD TEG: 8.6 MINUTE (ref 5–10)
CLOT STRENGTH P HPASE BLD TEG: 6 KD/SC (ref 4.5–11)
CO2 SERPL-SCNC: 26 MMOL/L (ref 20–32)
CO2 SERPL-SCNC: 26 MMOL/L (ref 20–32)
CODING SYSTEM: NORMAL
CREAT SERPL-MCNC: 1.55 MG/DL (ref 0.52–1.04)
CREAT SERPL-MCNC: 1.59 MG/DL (ref 0.52–1.04)
CRP SERPL-MCNC: 71 MG/L (ref 0–8)
D DIMER PPP FEU-MCNC: >20 UG/ML FEU (ref 0–0.5)
DIASTOLIC BLOOD PRESSURE - MUSE: NORMAL MMHG
DIASTOLIC BLOOD PRESSURE - MUSE: NORMAL MMHG
ERYTHROCYTE [DISTWIDTH] IN BLOOD BY AUTOMATED COUNT: 16.4 % (ref 10–15)
ERYTHROCYTE [DISTWIDTH] IN BLOOD BY AUTOMATED COUNT: 16.6 % (ref 10–15)
ERYTHROCYTE [SEDIMENTATION RATE] IN BLOOD BY WESTERGREN METHOD: 18 MM/HR (ref 0–30)
FIBRINOGEN PPP-MCNC: 163 MG/DL (ref 170–490)
GFR SERPL CREATININE-BSD FRML MDRD: 35 ML/MIN/1.73M2
GFR SERPL CREATININE-BSD FRML MDRD: 36 ML/MIN/1.73M2
GLUCOSE BLD-MCNC: 100 MG/DL (ref 70–99)
GLUCOSE BLD-MCNC: 109 MG/DL (ref 70–99)
GLUCOSE BLD-MCNC: 120 MG/DL (ref 70–99)
GLUCOSE BLD-MCNC: 122 MG/DL (ref 70–99)
GLUCOSE BLDC GLUCOMTR-MCNC: 104 MG/DL (ref 70–99)
GLUCOSE BLDC GLUCOMTR-MCNC: 113 MG/DL (ref 70–99)
GLUCOSE BLDC GLUCOMTR-MCNC: 147 MG/DL (ref 70–99)
GLUCOSE BLDC GLUCOMTR-MCNC: 80 MG/DL (ref 70–99)
GLUCOSE BLDC GLUCOMTR-MCNC: 89 MG/DL (ref 70–99)
HCO3 BLD-SCNC: 22 MMOL/L (ref 21–28)
HCO3 BLD-SCNC: 23 MMOL/L (ref 21–28)
HCO3 BLD-SCNC: 23 MMOL/L (ref 21–28)
HCO3 BLD-SCNC: 24 MMOL/L (ref 21–28)
HCO3 BLD-SCNC: 25 MMOL/L (ref 21–28)
HCO3 BLD-SCNC: 26 MMOL/L (ref 21–28)
HCO3 BLD-SCNC: 26 MMOL/L (ref 21–28)
HCO3 BLDA-SCNC: 26 MMOL/L (ref 21–28)
HCO3 BLDV-SCNC: 27 MMOL/L (ref 21–28)
HCT VFR BLD AUTO: 27.6 % (ref 35–47)
HCT VFR BLD AUTO: 28.7 % (ref 35–47)
HGB BLD-MCNC: 10.2 G/DL (ref 11.7–15.7)
HGB BLD-MCNC: 9.7 G/DL (ref 11.7–15.7)
HGB FREE PLAS-MCNC: 30 MG/DL
INR PPP: 1.73 (ref 0.85–1.15)
INR PPP: 1.9 (ref 0.85–1.15)
INTERPRETATION ECG - MUSE: NORMAL
INTERPRETATION ECG - MUSE: NORMAL
INTERPRETATION TEGPIA: NORMAL
INTERPRETATION TEGPIA: NORMAL
ISSUE DATE AND TIME: NORMAL
LACTATE SERPL-SCNC: 7 MMOL/L (ref 0.7–2)
LACTATE SERPL-SCNC: 8.8 MMOL/L (ref 0.7–2)
LDH SERPL L TO P-CCNC: 1094 U/L (ref 81–234)
MAGNESIUM SERPL-MCNC: 1.9 MG/DL (ref 1.6–2.3)
MAGNESIUM SERPL-MCNC: 2.3 MG/DL (ref 1.6–2.3)
MCF P HPASE BLD TEG: 54.7 MM (ref 50–70)
MCH RBC QN AUTO: 29.4 PG (ref 26.5–33)
MCH RBC QN AUTO: 29.5 PG (ref 26.5–33)
MCHC RBC AUTO-ENTMCNC: 35.1 G/DL (ref 31.5–36.5)
MCHC RBC AUTO-ENTMCNC: 35.5 G/DL (ref 31.5–36.5)
MCV RBC AUTO: 83 FL (ref 78–100)
MCV RBC AUTO: 84 FL (ref 78–100)
O2/TOTAL GAS SETTING VFR VENT: 60 %
O2/TOTAL GAS SETTING VFR VENT: 80 %
OXYHGB MFR BLD: 92 % (ref 92–100)
OXYHGB MFR BLD: 94 % (ref 92–100)
OXYHGB MFR BLD: 95 % (ref 92–100)
OXYHGB MFR BLD: 95 % (ref 92–100)
OXYHGB MFR BLD: 97 % (ref 92–100)
OXYHGB MFR BLDA: 97 % (ref 75–100)
OXYHGB MFR BLDV: 77 %
P AXIS - MUSE: 20 DEGREES
P AXIS - MUSE: 43 DEGREES
PA AA BLD-ACNC: 15 %
PA AA BLD-ACNC: 56 %
PA ADP BLD-ACNC: 66 %
PA ADP BLD-ACNC: 91 %
PCO2 BLD: 30 MM HG (ref 35–45)
PCO2 BLD: 30 MM HG (ref 35–45)
PCO2 BLD: 33 MM HG (ref 35–45)
PCO2 BLD: 33 MM HG (ref 35–45)
PCO2 BLD: 35 MM HG (ref 35–45)
PCO2 BLD: 39 MM HG (ref 35–45)
PCO2 BLD: 39 MM HG (ref 35–45)
PCO2 BLDA: 44 MM HG (ref 35–45)
PCO2 BLDV: 50 MM HG (ref 40–50)
PH BLD: 7.36 [PH] (ref 7.35–7.45)
PH BLD: 7.42 [PH] (ref 7.35–7.45)
PH BLD: 7.42 [PH] (ref 7.35–7.45)
PH BLD: 7.44 [PH] (ref 7.35–7.45)
PH BLD: 7.48 [PH] (ref 7.35–7.45)
PH BLD: 7.52 [PH] (ref 7.35–7.45)
PH BLD: 7.55 [PH] (ref 7.35–7.45)
PH BLDA: 7.39 [PH] (ref 7.35–7.45)
PH BLDV: 7.35 [PH] (ref 7.32–7.43)
PHOSPHATE SERPL-MCNC: 4.8 MG/DL (ref 2.5–4.5)
PLATELET # BLD AUTO: 59 10E3/UL (ref 150–450)
PLATELET # BLD AUTO: 73 10E3/UL (ref 150–450)
PO2 BLD: 106 MM HG (ref 80–105)
PO2 BLD: 127 MM HG (ref 80–105)
PO2 BLD: 132 MM HG (ref 80–105)
PO2 BLD: 60 MM HG (ref 80–105)
PO2 BLD: 66 MM HG (ref 80–105)
PO2 BLD: 74 MM HG (ref 80–105)
PO2 BLD: 86 MM HG (ref 80–105)
PO2 BLDA: 191 MM HG (ref 80–105)
PO2 BLDV: 41 MM HG (ref 25–47)
POTASSIUM BLD-SCNC: 3 MMOL/L (ref 3.4–5.3)
POTASSIUM BLD-SCNC: 3.2 MMOL/L (ref 3.4–5.3)
PR INTERVAL - MUSE: 130 MS
PR INTERVAL - MUSE: 130 MS
PROCALCITONIN SERPL-MCNC: >200 NG/ML
PROT SERPL-MCNC: 2.8 G/DL (ref 6.8–8.8)
PROT SERPL-MCNC: 3.1 G/DL (ref 6.8–8.8)
QRS DURATION - MUSE: 64 MS
QRS DURATION - MUSE: 72 MS
QT - MUSE: 386 MS
QT - MUSE: 452 MS
QTC - MUSE: 490 MS
QTC - MUSE: 497 MS
R AXIS - MUSE: 33 DEGREES
R AXIS - MUSE: 35 DEGREES
RADIOLOGIST FLAGS: ABNORMAL
RBC # BLD AUTO: 3.3 10E6/UL (ref 3.8–5.2)
RBC # BLD AUTO: 3.46 10E6/UL (ref 3.8–5.2)
SODIUM SERPL-SCNC: 154 MMOL/L (ref 133–144)
SODIUM SERPL-SCNC: 156 MMOL/L (ref 133–144)
SYSTOLIC BLOOD PRESSURE - MUSE: NORMAL MMHG
SYSTOLIC BLOOD PRESSURE - MUSE: NORMAL MMHG
T AXIS - MUSE: 41 DEGREES
T AXIS - MUSE: 53 DEGREES
TROPONIN I SERPL-MCNC: 85.33 UG/L (ref 0–0.04)
TROPONIN I SERPL-MCNC: 87.41 UG/L (ref 0–0.04)
UFH PPP CHRO-ACNC: <0.1 IU/ML
UNIT ABO/RH: NORMAL
UNIT NUMBER: NORMAL
UNIT STATUS: NORMAL
UNIT TYPE ISBT: 8400
VENTRICULAR RATE- MUSE: 73 BPM
VENTRICULAR RATE- MUSE: 97 BPM
WBC # BLD AUTO: 14.2 10E3/UL (ref 4–11)
WBC # BLD AUTO: 17.6 10E3/UL (ref 4–11)

## 2021-07-26 PROCEDURE — 85652 RBC SED RATE AUTOMATED: CPT | Performed by: INTERNAL MEDICINE

## 2021-07-26 PROCEDURE — 83615 LACTATE (LD) (LDH) ENZYME: CPT | Performed by: INTERNAL MEDICINE

## 2021-07-26 PROCEDURE — 85027 COMPLETE CBC AUTOMATED: CPT | Performed by: INTERNAL MEDICINE

## 2021-07-26 PROCEDURE — 250N000011 HC RX IP 250 OP 636: Performed by: INTERNAL MEDICINE

## 2021-07-26 PROCEDURE — 258N000001 HC RX 258: Performed by: INTERNAL MEDICINE

## 2021-07-26 PROCEDURE — 95718 EEG PHYS/QHP 2-12 HR W/VEEG: CPT | Mod: GC | Performed by: PSYCHIATRY & NEUROLOGY

## 2021-07-26 PROCEDURE — 84100 ASSAY OF PHOSPHORUS: CPT | Performed by: INTERNAL MEDICINE

## 2021-07-26 PROCEDURE — 82803 BLOOD GASES ANY COMBINATION: CPT | Performed by: INTERNAL MEDICINE

## 2021-07-26 PROCEDURE — 272N000555 HC SENSOR NIRS OXIMETER, ADULT

## 2021-07-26 PROCEDURE — P9037 PLATE PHERES LEUKOREDU IRRAD: HCPCS

## 2021-07-26 PROCEDURE — 85396 CLOTTING ASSAY WHOLE BLOOD: CPT | Performed by: INTERNAL MEDICINE

## 2021-07-26 PROCEDURE — 85730 THROMBOPLASTIN TIME PARTIAL: CPT | Performed by: INTERNAL MEDICINE

## 2021-07-26 PROCEDURE — 83605 ASSAY OF LACTIC ACID: CPT | Performed by: INTERNAL MEDICINE

## 2021-07-26 PROCEDURE — 85610 PROTHROMBIN TIME: CPT | Performed by: INTERNAL MEDICINE

## 2021-07-26 PROCEDURE — 99291 CRITICAL CARE FIRST HOUR: CPT | Mod: GC | Performed by: INTERNAL MEDICINE

## 2021-07-26 PROCEDURE — 258N000003 HC RX IP 258 OP 636: Performed by: INTERNAL MEDICINE

## 2021-07-26 PROCEDURE — 83735 ASSAY OF MAGNESIUM: CPT | Performed by: INTERNAL MEDICINE

## 2021-07-26 PROCEDURE — 85347 COAGULATION TIME ACTIVATED: CPT

## 2021-07-26 PROCEDURE — 250N000013 HC RX MED GY IP 250 OP 250 PS 637: Performed by: INTERNAL MEDICINE

## 2021-07-26 PROCEDURE — 82330 ASSAY OF CALCIUM: CPT | Performed by: INTERNAL MEDICINE

## 2021-07-26 PROCEDURE — 82805 BLOOD GASES W/O2 SATURATION: CPT | Performed by: INTERNAL MEDICINE

## 2021-07-26 PROCEDURE — 84450 TRANSFERASE (AST) (SGOT): CPT | Performed by: INTERNAL MEDICINE

## 2021-07-26 PROCEDURE — 85520 HEPARIN ASSAY: CPT | Performed by: INTERNAL MEDICINE

## 2021-07-26 PROCEDURE — 93005 ELECTROCARDIOGRAM TRACING: CPT

## 2021-07-26 PROCEDURE — 84155 ASSAY OF PROTEIN SERUM: CPT | Performed by: INTERNAL MEDICINE

## 2021-07-26 PROCEDURE — 85300 ANTITHROMBIN III ACTIVITY: CPT | Performed by: INTERNAL MEDICINE

## 2021-07-26 PROCEDURE — 83051 HEMOGLOBIN PLASMA: CPT | Performed by: INTERNAL MEDICINE

## 2021-07-26 PROCEDURE — 84484 ASSAY OF TROPONIN QUANT: CPT | Performed by: INTERNAL MEDICINE

## 2021-07-26 PROCEDURE — 250N000009 HC RX 250: Performed by: INTERNAL MEDICINE

## 2021-07-26 PROCEDURE — 70450 CT HEAD/BRAIN W/O DYE: CPT | Mod: 26 | Performed by: STUDENT IN AN ORGANIZED HEALTH CARE EDUCATION/TRAINING PROGRAM

## 2021-07-26 PROCEDURE — 82247 BILIRUBIN TOTAL: CPT | Performed by: INTERNAL MEDICINE

## 2021-07-26 PROCEDURE — 82947 ASSAY GLUCOSE BLOOD QUANT: CPT | Performed by: INTERNAL MEDICINE

## 2021-07-26 PROCEDURE — 33949 ECMO/ECLS DAILY MGMT ARTERY: CPT

## 2021-07-26 PROCEDURE — 71045 X-RAY EXAM CHEST 1 VIEW: CPT

## 2021-07-26 PROCEDURE — 94003 VENT MGMT INPAT SUBQ DAY: CPT

## 2021-07-26 PROCEDURE — 999N000185 HC STATISTIC TRANSPORT TIME EA 15 MIN

## 2021-07-26 PROCEDURE — 70450 CT HEAD/BRAIN W/O DYE: CPT

## 2021-07-26 PROCEDURE — 86140 C-REACTIVE PROTEIN: CPT | Performed by: INTERNAL MEDICINE

## 2021-07-26 PROCEDURE — 99232 SBSQ HOSP IP/OBS MODERATE 35: CPT | Mod: GC | Performed by: PSYCHIATRY & NEUROLOGY

## 2021-07-26 PROCEDURE — 999N000157 HC STATISTIC RCP TIME EA 10 MIN

## 2021-07-26 PROCEDURE — 71045 X-RAY EXAM CHEST 1 VIEW: CPT | Mod: 26 | Performed by: STUDENT IN AN ORGANIZED HEALTH CARE EDUCATION/TRAINING PROGRAM

## 2021-07-26 PROCEDURE — 85384 FIBRINOGEN ACTIVITY: CPT | Performed by: INTERNAL MEDICINE

## 2021-07-26 PROCEDURE — 85379 FIBRIN DEGRADATION QUANT: CPT | Performed by: INTERNAL MEDICINE

## 2021-07-26 PROCEDURE — 82040 ASSAY OF SERUM ALBUMIN: CPT | Performed by: INTERNAL MEDICINE

## 2021-07-26 PROCEDURE — C9113 INJ PANTOPRAZOLE SODIUM, VIA: HCPCS | Performed by: INTERNAL MEDICINE

## 2021-07-26 PROCEDURE — 95711 VEEG 2-12 HR UNMONITORED: CPT

## 2021-07-26 RX ORDER — POTASSIUM CHLORIDE 1.5 G/1.58G
20 POWDER, FOR SOLUTION ORAL ONCE
Status: COMPLETED | OUTPATIENT
Start: 2021-07-26 | End: 2021-07-26

## 2021-07-26 RX ORDER — POTASSIUM CHLORIDE 20MEQ/15ML
40 LIQUID (ML) ORAL ONCE
Status: COMPLETED | OUTPATIENT
Start: 2021-07-26 | End: 2021-07-26

## 2021-07-26 RX ORDER — DEXTROSE MONOHYDRATE 100 MG/ML
INJECTION, SOLUTION INTRAVENOUS CONTINUOUS
Status: DISCONTINUED | OUTPATIENT
Start: 2021-07-26 | End: 2021-07-26 | Stop reason: HOSPADM

## 2021-07-26 RX ADMIN — POTASSIUM CHLORIDE 20 MEQ: 1.5 POWDER, FOR SOLUTION ORAL at 06:42

## 2021-07-26 RX ADMIN — PANTOPRAZOLE SODIUM 40 MG: 40 INJECTION, POWDER, FOR SOLUTION INTRAVENOUS at 08:41

## 2021-07-26 RX ADMIN — CALCIUM CHLORIDE 1 G: 100 INJECTION, SOLUTION INTRAVENOUS at 12:12

## 2021-07-26 RX ADMIN — TICAGRELOR 90 MG: 90 TABLET ORAL at 08:41

## 2021-07-26 RX ADMIN — ASPIRIN 81 MG: 81 TABLET, CHEWABLE ORAL at 08:41

## 2021-07-26 RX ADMIN — SODIUM BICARBONATE 50 MEQ/HR: 84 INJECTION, SOLUTION INTRAVENOUS at 06:18

## 2021-07-26 RX ADMIN — CALCIUM CHLORIDE 1 G: 100 INJECTION, SOLUTION INTRAVENOUS at 06:31

## 2021-07-26 RX ADMIN — SODIUM BICARBONATE 50 MEQ/HR: 84 INJECTION, SOLUTION INTRAVENOUS at 01:38

## 2021-07-26 RX ADMIN — PIPERACILLIN SODIUM AND TAZOBACTAM SODIUM 3.38 G: 3; .375 INJECTION, POWDER, LYOPHILIZED, FOR SOLUTION INTRAVENOUS at 01:24

## 2021-07-26 RX ADMIN — DEXTROSE MONOHYDRATE: 100 INJECTION, SOLUTION INTRAVENOUS at 12:24

## 2021-07-26 RX ADMIN — PIPERACILLIN SODIUM AND TAZOBACTAM SODIUM 3.38 G: 3; .375 INJECTION, POWDER, LYOPHILIZED, FOR SOLUTION INTRAVENOUS at 11:03

## 2021-07-26 RX ADMIN — Medication 0.24 MCG/KG/MIN: at 08:41

## 2021-07-26 RX ADMIN — SODIUM CHLORIDE, POTASSIUM CHLORIDE, SODIUM LACTATE AND CALCIUM CHLORIDE 500 ML: 600; 310; 30; 20 INJECTION, SOLUTION INTRAVENOUS at 12:00

## 2021-07-26 RX ADMIN — EPINEPHRINE 0.06 MCG/KG/MIN: 1 INJECTION PARENTERAL at 09:42

## 2021-07-26 RX ADMIN — PIPERACILLIN SODIUM AND TAZOBACTAM SODIUM 3.38 G: 3; .375 INJECTION, POWDER, LYOPHILIZED, FOR SOLUTION INTRAVENOUS at 05:17

## 2021-07-26 RX ADMIN — Medication 3 UNITS/HR: at 01:38

## 2021-07-26 RX ADMIN — MAGNESIUM SULFATE IN WATER 2 G: 40 INJECTION, SOLUTION INTRAVENOUS at 05:32

## 2021-07-26 RX ADMIN — POTASSIUM CHLORIDE 40 MEQ: 40 SOLUTION ORAL at 13:33

## 2021-07-26 ASSESSMENT — ACTIVITIES OF DAILY LIVING (ADL)
ADLS_ACUITY_SCORE: 19

## 2021-07-26 ASSESSMENT — MIFFLIN-ST. JEOR: SCORE: 1475

## 2021-07-26 NOTE — PROGRESS NOTES
ECLS Discontinuation Note:    ECLS was discontinued at the bedside 7/26/2021 @ 1500 per family wishes.    Tonio Elias, RT  ECMO Specialist  7/26/2021 3:09 PM

## 2021-07-26 NOTE — PLAN OF CARE
Nights:  Nights:  RIC  Remains unresponsive with sedation off.  Epi and levo, Vaso down considerably overnight (see Mar).  Pt still losing blood and suspected internal Right groin/perineal.  No large external bleeding noted. SR with PVC's.  Coarse lungs and pt temp maintained at 38.0 C.  Urine 50cc/hr. Large amounts of thin brown stool with foul, foul odor 1.5 liters or more.of stool.  Large hematoma on external right thigh with blistering. Product given included 2 units of PRBCS, 1 units of platelets, 3 Liters of NS. Patient remains on [facility] ECMO, all equipment is functioning and alarms are appropriately set. RPM's 3200 with flow range 2.8- L/min. Sweep gas is at 1 LPM and FiO2 50%. Circuit remains free of air, clot and fibrin. Cannulas are secure with no bleeding from site. Extremities are cool and mottled to touch. Suctioned ETT for thick clotted blood.  P  Continue ECMO management.

## 2021-07-26 NOTE — PROGRESS NOTES
"Neurocritical Care Note:  S: Intubated, unresponsive     O:  Pulse 103   Temp 97.9  F (36.6  C)   Resp 10   Ht 1.626 m (5' 4\")   Wt 93 kg (205 lb 0.4 oz)   SpO2 94%   BMI 35.19 kg/m    EXAM: Examined off sedation  - No spontaneous eye opening or in response to verbal or tactile stimuli  - No eye movements, conjugate gaze  - Pupils symmetric, +4mm, dilated and fixed bilaterally  - Absent corneal response  - No clear facial asymmetry  - No cough or gag present with deep suctioning  - No limb movements, does not withdraw to noxious stimuli       Imaging and labs personally reviewed in EMR.     A/P:  Karen Jacobsen is a 60 year old female with a PMH of multiple sclerosis and sarcoidosis who was admitted on 7/24/2021 following cardiac arrest. The patient is sedated and cooled per protocol. History was obtained from chart review. She reportedly complained of an episode of chest pain/shortness of breath two weeks ago; and another episode of chest pain (confused for heartburn) on day of arrest. About 30 mins after the episode of chest pain, she lost consciousness (witnessed arrest) in front of her family; no bystander CPR given. Five mins later (11:45 am), EMS arrived on scene. NCC consulted for prognostication following arrest.     Recommendations:  Patient remains unresponsive while cannulated. Neurological exam very concerning. CT head with increased loss of gray/white differentiation, herniation, and new subarachnoid hemorrhage. Primary care team to speak with family today, will remain available for any family meetings or questions if needed.      Neuro:  #VF/PEA arrest  #Hypoxic injury  - Continue vEEG for now  - Avoid hypotonic solutions as they may worsen cerebral edema  - Avoid nitroprusside/nitroglycerin as able - can increase ICP's   - Advise slow correction of any high sodiums if needed  - We will continue to follow and monitor her EEG and neurologic exam, please call #[phone] with any questions        This " patient was seen and discussed with attending neurointensivist, Dr Taylor.        Skye MOTTA CNP  NeuroCritical Care Nurse Practitioner  Pager: [phone]  Ascom: [phone] available M-Ryan [phone] to [phone]

## 2021-07-26 NOTE — DEATH PRONOUNCEMENT
MD DEATH PRONOUNCEMENT    Called to pronounce Sharon Jacobsen dead.    Physical Exam: Unresponsive to noxious stimuli, Spontaneous respirations absent, Breath sounds absent, Carotid pulse absent, Heart sounds absent, Pupillary light reflex absent and Corneal blink reflex absent    Patient was pronounced dead at 3:15 pm, 2021.    Preliminary Cause of Death: Cardiac arrest    Active Problems:    Cardiac arrest (H)    Acute kidney failure, unspecified (H)       Infectious disease present?: NO    Communicable disease present? (examples: HIV, chicken pox, TB, Ebola, CJD) :  NO    Multi-drug resistant organism present? (example: MRSA): NO    Please consider an autopsy if any of the following exist:  NO Unexpected or unexplained death during or following any dental, medical, or surgical diagnostic treatment procedures.   NO Death of mother at or up to seven days after delivery.     NO All  and pediatric deaths.     NO Death where the cause is sufficiently obscure to delay completion of the death certificate.   NO Deaths in which autopsy would confirm a suspected illness/condition that would affect surviving family members or recipients of transplanted organs.     The following deaths must be reported to the 's Office:  NO A death that may be due entirely or in part to any factors other than natural disease (recent surgery, recent trauma, suspected abuse/neglect).   NO A death that may be an accident, suicide, or homicide.     NO Any sudden, unexpected death in which there is no prior history of significant heart disease or any other condition associated with sudden death.   NO A death under suspicious, unusual, or unexpected circumstances.    NO Any death which is apparently due to natural causes but in which the  does not have a personal physician familiar with the patient s medical history, social, or environmental situation or the circumstances of the terminal event.   NO Any death  apparently due to Sudden Infant Death Syndrome.     NO Deaths that occur during, in association with, or as consequences of a diagnostic, therapeutic, or anesthetic procedure.   NO Any death in which a fracture of a major bone has occurred within the past (6) six months.   NO A death of persons note seen by their physician within 120 days of demise.     NO Any death in which the  was an inmate of a public institution or was in the custody of Law Enforcement personnel.   NO  All unexpected deaths of children   NO Solid organ donors   NO Unidentified bodies   NO Deaths of persons whose bodies are to be cremated or otherwise disposed of so that the bodies will later be unavailable for examination;   NO Deaths unattended by a physician outside of a licensed healthcare facility or licensed residential hospice program   NO Deaths occurring within 24 hours of arrival to a health care facility if death is unexpected.    NO Deaths associated with the decedent s employment.   NO Deaths attributed to acts of terrorism.   NO Any death in which there is uncertainty as to whether it is a medical examiner s care should be discussed with the medical investigator.        Body disposition: Body released to the morgue.    Donna Ash MD,   Cardiovascular Disease Fellow  Pager [phone]

## 2021-07-26 NOTE — PROGRESS NOTES
Vascular surgery progress note:    Admitted after cardiac arrest due to myocardial infarction. CPR started after EMS arrival and required 60 minutes of compression before cardiac catheterization and stenting of right coronary artery. Currently on ECMO and multiple vasoactive agents.    We are consulted for a retroperitoneal hematoma as well as bleeding around the arterial ECMO cannula site as evidenced by a hematoma visible on CT scan and bleeding around the groin site. Sheath sized upsized with decrease in bleeding per nursing reports.     Patient is currently not showing any neurologic response when off sedating agents. CT scan of the brain showing hypoxic ischemic changes.     Dopplerable monophasic lower extremity signals bilaterally with evidence of patency on duplex scan obtained yesterday.     In the setting decreased bleeding from the groin, current critical illness state, and poor likelihood of achieving neurologic recovery, intervention from our end is unlikely to be of benefit for the patient.     Vascular surgery will sign off. Please do not hesitate to contact us for any further questions or concerns.     Chadwick Cruz MD  PGY 2

## 2021-07-26 NOTE — PROGRESS NOTES
"SPIRITUAL HEALTH SERVICES Progress Note  Unit 4E Referral on-call page    SH introduced to family (in-person and 1 member virtually) and pt after the decision was made to remove pt from life support. SH assessed emotional/spiritual needs and resources of the various family members and offered ongoing reflective conversation.    Relative requesting Prayer of Commendation for pt. SH offered space for reflection on gratitude, forgiveness for family members, forgiveness for pt, and encouraged all gathered to vocally express \"I love you.\" SH then prayed a Prayer of Commendation as family were encouraged to touch the pt and remained with family as they removed life support.    SH then offered reflective conversation as a relative engaged in a brief life review of events leading up to this hospitalization and strained family dynamics and framing conversation in the 'new family' formed after this death.    Plan: SH will remain available for any emotional/spiritual needs from pt, family, staff per consult request.    Floyd Judge   Intern  Pager [phone]    "

## 2021-07-26 NOTE — PROGRESS NOTES
ECMO Attending Progress Note  2021    Martine Ulloa is a 60 year old female who was cannulated for ECMO 2021 due to refractory VF cardiac arrest     Cannulation Site:  15 Fr in the R femoral artery  25 Fr in the R femoral vein    Interval events: bilaterally fixed and dilated pupils sending for head ct, upsized the reperfusion cannula yesterday. right lower ext cool nurse and fellow unable to Doppler pulses, ext cool.     Physical Exam:  Temp:  [91.6  F (33.1  C)-100  F (37.8  C)] 97.9  F (36.6  C)  Pulse:  [] 103  Resp:  [10] 10  MAP:  [62 mmHg-279 mmHg] 79 mmHg  Arterial Line BP: ()/() 82/76  FiO2 (%):  [60 %-80 %] 80 %  SpO2:  [41 %-100 %] 94 %    Intake/Output Summary (Last 24 hours) at 2021 1346  Last data filed at 2021 1300  Gross per 24 hour   Intake 52009.84 ml   Output 5350 ml   Net 5621.84 ml    Ventilation Mode: PCV Plus assist  (Pressure Control Ventilation/ Assist Control)  FiO2 (%): 80 %  Rate Set (breaths/minute): 10 breaths/min  PEEP (cm H2O): 10 cmH2O  Pressure Support (cm H2O): 10 cmH2O  Oxygen Concentration (%): 60 %  Peak Inspiratory Pressure (cm H2O) (Drager Muna): 30  Resp: 10       Labs:  Recent Labs   Lab 21  1214 21  1006 21  0810 21  0627   PH 7.44 7.48* 7.55* 7.52*   PCO2 33* 33* 30* 30*   PO2 106* 127* 66* 74*   HCO3 23 24 26 25   O2PER 80 80 80 60      Recent Labs   Lab 21  1005 21  0356 21  2233 21  1606   WBC 17.6* 14.2* 12.6* 9.2   HGB 10.2* 9.7* 10.8* 6.2*     Creatinine   Date Value Ref Range Status   2021 1.59 (H) 0.52 - 1.04 mg/dL Final   2021 1.55 (H) 0.52 - 1.04 mg/dL Final   2021 1.36 (H) 0.52 - 1.04 mg/dL Final   2021 1.41 (H) 0.52 - 1.04 mg/dL Final       Blood Flow (Circuit) LPM: 3.12 LPM  Gas Flow  LPM: 3 LPM  Gas FiO2   %: 60 %  ACT  (seconds): 171 seconds  Blood Temp  (degrees C): 36.5 C  Pulse Oximetry  (SpO2%): 100 %  Arterial Pressure  mmH  mmHg      ECMO Issues including assessments and plan on DOS 7/26/2021:  Neuro: Sedated for mechanical ventilation and ECMO.  No acute distress.  RASS goal: -2  CV: Cardiogenic shock.  Hemodynamically stable on 0.06 epi and 0.24 NE  Pulm: Keep vent settings at rest settings as above.  FEN/Renal: Electrolytes stable w/ replacement protocols in place, cr rising UOP 40-60cc/hr  Heme: ACT goal: 180-200 Hemoglobin 9.7 .  Minimal oozing around the ECMO cannulas at this time.  ID: Receiving empiric antibiotics  Cannulae:Position is acceptable on exam and the available imaging.  Distal perfusion cannula is in place with thrombus in the arterial cannula side, will likely need tubing replaced concerned there is low flow to the rightleg but will wait for head ct. Vascular surgery aware and following. Hep flowing in reperfusion cannula without obstruction but unable to draw back blood.     I have personally reviewed the ECMO flows, oxygenation and CO2 clearance, anticoagulation, and cannula position.  I have also personally assessed the patient's systemic response with hemodynamics, oxygenation, ventilation, and bleeding.       The patient requires continued ECMO support and management in the ICU.  I have discussed patient care and treatment plan with the primary team.      Jerardo Ingram MD  Critical Care Cardiology  [phone]    July 26, 2021

## 2021-07-26 NOTE — DISCHARGE SUMMARY
[address] [address]  p: [phone]    Discharge Summary: Cardiology Service    Sharon Jacobsen MRN# [id number]   YOB: 1960 Age: 60 year old       Admission Date: 7/24/2021  Discharge Date: 07/26/21      Discharge Diagnoses:  # VFib cardiac arrest  # Inferior STEMI s/p MIC to prox-distal RCA  # Lactic acidosis  # Cardiogenic shock  # Acute hypoxic resp failure  # Ischemic hepatitis  # Uncal herniation  # SAH  # MACARENA  # Large R groin hematoma w/ active CFA bleeding s/p upsizing of cannula  # Acute blood loss anemia in addition to chronic anemia (uncertain whether MISA or anemia of chronic disease)      HPI:  Ms Karen Jacobsen is a  60 year old female being admitted on 7/24/2021. The patient has a PMHx of multiple sclerosis and sarcoidosis (unclear treatment details). The patient presented on 7/24/2021 as a VFib ECMO activation. She was found to have inferior STEMI with 100% occlusion of Ost RCA to Dist RCA lesion 100% stenoses (type C- high risk lesion), status post PCI (stenting and thrombectomy) of proximal to mid RCA, DAVIAN 3 flow obtained. Patient transferred to the CICU for further management of cardiac arrest s/p [facility] ECMO cannulation.     Overnight on 7/24/2021, patient noted to have escalating pressor requirements (max dose- epi, vaso, nor-epinephrine); along with multiple blood products (11 U PRBC, 7 FFP, LR 8L, 2U PLT). CTA done this AM showed a large subcutaneous hematoma overlying the right hip measuring approximately 16.4 x 7 cm; with evidence for active arterial extravasation arising from the right  common femoral artery adjacent to the cannula. Vascular surgery consulted, to assess patient, recommend consideration of upsizing cannula vs alternative ECMO access site w/ surgical closure of the current one. In addition, colonic wall thickening in the ascending and transverse colon possibly related to ischemic bowel versus  volume overload.      CT head on arrival revealed decreased gray-white matter differentiation with effacement of cerebral sulci, concerning for hypoxic ischemic injury with cerebral edema. Follow up CT head done this morning revealed increased loss of gray-white matter differentiation in the right cerebral hemisphere concerning for acute infarction. There was an associated 1 cm leftward subfalcine herniation, transtentorial and  uncal herniation, effacement of the suprasellar and basilar cisterns and downward displacement of the cerebellar tonsils. Given the above findings, she was deemed to have suffered significant brain injury likely to progress to brain death if not already. Family meeting held, prognosis discussed, patient made comfort care, passed away at 3:15 pm on 2021, surrounded by loved ones.      Condition on discharge:     Physical Exam: Unresponsive to noxious stimuli, Spontaneous respirations absent, Breath sounds absent, Carotid pulse absent, Heart sounds absent, Pupillary light reflex absent and Corneal blink reflex absent    Discharge medications:   There are no discharge medications for this patient.      Donna Ash MD,   Cardiovascular Disease Fellow  Pager [phone]

## 2021-07-26 NOTE — PROGRESS NOTES
ECMO Shift Summary: 1900-0700        ECMO Equipment:  Console serial number: [id number]  Circuit Lot number: [id number]  Oxygenator Lot number: [id number]    Patient remains on VA ECMO, all equipment is functioning and alarms are appropriately set. RPM's 7603-2852 with flow range 2.69-3.14 L/min. Sweep gas is at 3 LPM and FiO2 60%. Circuit remains free of air, clot and fibrin. Cannulas are secure with large bleeding from site. Extremities are mottled, dusky and gray. R side hematoma no change.    Significant Shift Events:   -Bicarb continues to run via circuit.   -Does need intermittent IVF via PIV at 600ml/hr to achieve ECMO flow.  -Heater set at 38    Vent settings:  Ventilation Mode: PCV Plus assist  (Pressure Control Ventilation/ Assist Control)  FiO2 (%): 60 %  Rate Set (breaths/minute): 10 breaths/min  PEEP (cm H2O): 10 cmH2O  Oxygen Concentration (%): 60 %  Peak Inspiratory Pressure (cm H2O) (Drager Muna): 30  Resp: 10  .    Heparin is off due to bleeding and R hematoma.    Urine output is as RN charted, blood loss was moderate via R blister that popped as well as oozing at insertion site. Product given included 2units of PRBCs, 1 unit of Plts, as well as 150cc of LR directly to circuit for chugging.       Intake/Output Summary (Last 24 hours) at 7/26/2021 0415  Last data filed at 7/26/2021 0400  Gross per 24 hour   Intake 04718.69 ml   Output 2595 ml   Net 9995.69 ml       ECHO:  No results found for this or any previous visit.  No results found for this or any previous visit.      CXR:  Recent Results (from the past 24 hour(s))   CTA Abdomen Pelvis with Contrast   Result Value    Radiologist flags (Urgent)     Active subcutaneous bleed adjacent to the right common    Narrative    Exam: Computed tomographic angiography of the abdomen and pelvis  without and with contrast, including 3D reformations dated 7/25/2021  9:13 AM    Clinical information:  History of retroperitoneal bleed on ECMO.  Required  greater than 20 minutes blood products overnight. Concern for  active bleed and worsening hematoma    Technique: Helical scans through the abdomen and pelvis obtained  before the administration of intravenous contrast media and following  the injection of contrast media  in the arterial phase. Source images  reviewed as well as 3D and multi-planar reconstructions.    Contrast: iopamidol (ISOVUE-370) solution 119 mL       DLP: mGy*cm    Comparison study:    Findings:     Large subcutaneous hematoma overlying the right hip measuring  approximately 16.4 x 7 cm. The complete extent of this hematoma is not  entirely included in the field-of-view due to patient body habitus.  There is a additional possibly smaller loculated subcutaneous hematoma  overlying the anterior right lower quadrant measuring approximately  6.6 x 6.7 cm. There are multiple fluid density abnormalities with rim  hyperdense foci, consistent with probable loculated components of  hemorrhage. On the late arterial phase imaging there is a focal bright  blush of contrast adjacent to the right common femoral arterial  catheter (series 9, image [id number]). Grossly unchanged appearance of  the right retroperitoneal hematoma extending along the right flank to  the right pelvic sidewall. Small intermediate density perihepatic  ascites. 4.5 cm intraluminal catheter in the left lower quadrant  possibly representing a migrated biliary stent (series 3, image 46).  Diffuse anasarca with asymmetrically edematous right lower extremity  and right flank subcutaneous tissues due to the associated hematoma.    Left femoral arterial approach catheter with tip in the infrarenal  abdominal aorta. A left femoral venous approach central venous  catheter with tip at the infrahepatic IVC. Enteric tube within the  stomach. Postoperative changes of Angely-en-Y gastric bypass. Rectal  tube and urinary catheter in place.  Edematous colon most pronounced in the ascending and transverse  colon.  No pneumatosis, portal venous gas, or intra-abdominal free air.    Small right and trace left pleural effusions with associated  compressive atelectasis at the lung bases. Smooth interlobular septal  thickening with reticular groundglass opacities at the lung bases and  right middle lobe, possibly representing pulmonary edema versus  infection. Findings suggesting possible hematoma involvement of the  bilateral chest wall subcutaneous tissues as well with hyperdense  tissues adjacent to the chest wall musculature.    Cholecystectomy. The spleen is unremarkable. Bilateral adrenal glands  show prominent enhancement on the late arterial phase which is  suggestive of hypovolemia/shock adrenal as well. Unchanged simple  exophytic cyst in the interpolar region of the right kidney. No  hydronephrosis or renal stone.    Partial visualization of multiple right-sided rib fractures.    The abdominal aorta is normal in caliber.    The celiac axis, SMA and PAMELA are patent. The renal arteries are patent  bilaterally.    The splenic vein, portal vein, SMV and renal veins are  patent.  The hepatic veins and IVC are patent.      Impression    Impression:    1. Large subcutaneous hematoma overlying the right flank and right hip  with evidence for active arterial extravasation arising from the right  common femoral artery adjacent to the insertion of the right common  femoral arterial catheter.  2. No significant change in the right retroperitoneal hematoma. No  evidence for active retroperitoneal extravasation. Multiple loculated  areas of hematoma/hemorrhage.  3. Colonic wall thickening in the ascending and transverse colon  possibly related to shock bowel versus volume overload. Also,  secondary sign of hyperenhancing adrenal glands on the late arterial  phase suggestive of shock adrenal phenomenon.  4. Small volume intermediate density perihepatic ascites.  5. Smooth interlobular septal thickening and reticular opacities  in  the lung bases possibly representing infection versus pulmonary edema.  6. Small right and trace left pleural effusions.  7. Acute minimally displaced right-sided rib fractures.      [Urgent Result: Active subcutaneous bleed adjacent to the right common  femoral arterial catheter]    Finding was identified on 2021 8:36 AM.     Dr. Mohr was contacted by Dr. Silver at 2021 9:00 AM and  verbalized understanding of the urgent finding.     I have personally reviewed the examination and initial interpretation  and I agree with the findings.    MARCUS WITT MD         SYSTEM ID:  [id number]   Echocardiogram Complete   Result Value    LVEF  15-20%    Narrative    [id number]  [id number]  [id number]  [id number]^CATE^KEYANNA     [facility],[place]  Echocardiography Laboratory  [address] [place] [id number]     Name: EMILIA SHELTON  MRN: [id number]  : 1960  Study Date: 2021 10:42 AM  Age: 60 yrs  Gender: Female  Patient Location: [facility]  Reason For Study: Cardiac Arrest  Ordering Physician: KEYANNA ESPOSITO  Referring Physician: KEYANNA ESPOSITO  Performed By: Glenda Darby     BSA: 1.9 m2  Height: 64 in  Weight: 194 lb  HR: 105  ______________________________________________________________________________  Procedure  Echocardiogram with two-dimensional, color and spectral Doppler performed.  ______________________________________________________________________________  Interpretation Summary  On VA ECMO at 1LPM.  The visual ejection fraction is 15-20%.  Global right ventricular function is severely reduced.  Trivial pericardial effusion is present.  ______________________________________________________________________________  Left Ventricle  On VA ECMO at 1LPM. The visual ejection fraction is 15-20%.     Right Ventricle  Global right ventricular function is severely reduced.     Pericardium  Trivial pericardial effusion is present.      ______________________________________________________________________________  MMode/2D Measurements & Calculations  IVSd: 1.3 cm  LVIDd: 3.6 cm  LVIDs: 3.5 cm  LVPWd: 0.97 cm  FS: 3.9 %  LV mass(C)d: 132.8 grams  LV mass(C)dI: 68.8 grams/m2  RWT: 0.54     ______________________________________________________________________________  Report approved by: Gurjit Campos 07/25/2021 11:20 AM         US Carotid Bilateral    Impression    RESIDENT PRELIMINARY INTERPRETATION  Impression: Patent bilateral carotid arteries. Monophasic waveforms  secondary to known lung cannulation.    US Lower Extremity Arterial Duplex Bilateral    Impression    RESIDENT PRELIMINARY INTERPRETATION  Impression: Patent bilateral lower extremity arteries with monophasic  waveforms compatible with ECMO cannulation. The right CFA, PFA origin,  and SFA were obscured due to overlying dressing and ECMO cannula.       Labs:  Recent Labs   Lab 07/26/21  0127 07/25/21  2234 07/25/21 2022 07/25/21  1826   PH 7.36 7.40 7.37 7.26*   PCO2 39 37 37 45   PO2 132* 142* 231* 62*   HCO3 22 23 21 20*   O2PER 60 60 60 60       Lab Results   Component Value Date    HGB 10.8 (L) 07/25/2021    PHGB 30 (H) 07/25/2021    PLT 48 (LL) 07/25/2021    FIBR 134 (L) 07/25/2021    INR 2.06 (H) 07/25/2021    PTT 59 (H) 07/25/2021    DD 3.98 (H) 07/25/2021    ANTCH 45 (L) 07/25/2021         Plan is remain on VA ECMO. Intermittent boluses via PIV patient to help with chugging and maintaining flows greater than 3.       Bettie Granados RN  ECMO Specialist  7/26/2021 4:15 AM

## 2021-07-26 NOTE — PROGRESS NOTES
BRIEF SOCIAL WORK NOTE    SW assisting with identifying pt's NOK and confirming pt's identity.     Pt is listed as 'Sharon Jacobsen' in her notes; however, pt has generic '[place]' name listed in chart.     SW spoke with pt's daughter Nataly who verbally confirmed pt's name is Karen Jacobsen. Addtionally, Nataly stated she plans to visit pt today, and will bring pt's ID. SW requested Nataly bring this ID to the nurse's station to that identification can be confirmed and updated in chart.     Nataly also confirmed she is the NOK; Nataly stated she has other sisters who are all aware of pt's hospitalization, and are agreeable with Nataly making decisions on pt's behalf.     Addendum 2:29 PM     Bedside RN provided SW with  ID. SW relayed information to admissions, who stated they are able to update pt's name, even with ID being .       TIFFANY Ricardo, [facility]   [facility]

## 2021-07-26 NOTE — PLAN OF CARE
Major Shift Events: Patient on VA ECMO with continuous low flow and low MAPs this morning. Gave a total of 1 PLT, 2 Cryo, 6 PRBC, 1L LR and 4L NS throughout day with increasing pressor requirements. CT abd for R groin hematoma worsening. Pt back to Cath Lab for ECMO cannula exchange. Post exchange pt flowing better with decreased pressor requirements and less bleeding. Pupils remain 4mm, fixed per pupillometer. No neuro responses noted. EEG applied. Re-warmed today and sedation stopped.  Plan: Continue to monitor. Family at bedside and updated on patients status by myself and Dr. Ash.   For vital signs and complete assessments, please see documentation flowsheets.

## 2021-07-26 NOTE — PROGRESS NOTES
Patient transitioned to comfort care at 1500 per family request. Pt's family visited at bedside before withdrawing care. Jonnie (sister) and pt's son present as pt passed, time of death 1515. [employer] called with cardiac death. 3 rings and 2 necklaces sent with family. [place] [facility] [place].

## 2021-07-27 LAB
ATRIAL RATE - MUSE: 107 BPM
DIASTOLIC BLOOD PRESSURE - MUSE: NORMAL MMHG
INTERPRETATION ECG - MUSE: NORMAL
P AXIS - MUSE: NORMAL DEGREES
PR INTERVAL - MUSE: NORMAL MS
QRS DURATION - MUSE: 64 MS
QT - MUSE: 394 MS
QTC - MUSE: 523 MS
R AXIS - MUSE: -9 DEGREES
SYSTOLIC BLOOD PRESSURE - MUSE: NORMAL MMHG
T AXIS - MUSE: -48 DEGREES
VENTRICULAR RATE- MUSE: 106 BPM

## 2021-07-29 LAB — NSE SERPL IA-MCNC: NORMAL UG/L

## 2021-08-04 LAB
S100 CA BINDING PROTEIN B SER-MCNC: 7685 NG/L
S100 CA BINDING PROTEIN B SER-MCNC: 9245 NG/L

## 2021-08-05 LAB
7AMINOCLONAZEPAM SERPL-MCNC: NEGATIVE NG/ML
ALPRAZ SERPL-MCNC: NEGATIVE NG/ML
BENZODIAZ SPEC QL: POSITIVE
CHLORDIAZEP SERPL-MCNC: NEGATIVE NG/ML
CLONAZEPAM SERPL-MCNC: NEGATIVE NG/ML
DESALKYLFLURAZ SERPL CFM-MCNC: NEGATIVE NG/ML
DIAZEPAM SERPL-MCNC: NEGATIVE NG/ML
FLURAZEPAM SPEC-MCNC: NEGATIVE NG/ML
LORAZEPAM SERPL-MCNC: NEGATIVE NG/ML
MIDAZOLAM SERPL-MCNC: 137 NG/ML
NORCHLORDIAZEP SERPL-MCNC: NEGATIVE NG/ML
NORDIAZEPAM SPEC-MCNC: NEGATIVE NG/ML
NSE SERPL-MCNC: NORMAL UG/L
OXAZEPAM SERPL CFM-MCNC: NEGATIVE NG/ML
TEMAZEPAM SERPL-MCNC: NEGATIVE NG/ML
TRIAZOLAM SPEC-MCNC: NEGATIVE NG/ML

## 2021-08-09 LAB
6MAM SERPL-MCNC: NEGATIVE NG/ML
CODEINE SERPL-MCNC: NEGATIVE NG/ML
DHC SERPLBLD CFM-MCNC: NEGATIVE NG/ML
HYDROCODONE SERPL-MCNC: NEGATIVE NG/ML
HYDROMORPHONE SERPL-MCNC: NEGATIVE NG/ML
MORPHINE SERPL-MCNC: 20.8 NG/ML
OPIATES SPEC QL: POSITIVE
OXYCODONE SERPL-MCNC: NEGATIVE NG/ML
OXYCODONE SERPLBLD CFM-MCNC: NEGATIVE NG/ML
OXYMORPHONE SERPL-MCNC: NEGATIVE NG/ML

## 2021-08-10 LAB
AMPHET BLD CFM-MCNC: ABNORMAL NG/ML
AMPHET SERPLBLD CFM-MCNC: 157 NG/ML
APAP BLD-MCNC: NEGATIVE UG/ML
BARBITURATES SPEC-MCNC: NEGATIVE UG/ML
BENZODIAZ SPEC-MCNC: ABNORMAL NG/ML
BUPRENORPHINE SERPL-MCNC: NEGATIVE NG/ML
BZE BLD CFM-MCNC: NEGATIVE NG/ML
CARBOXYTHC BLD-MCNC: NEGATIVE NG/ML
CARISOPRODOL IA: NEGATIVE UG/ML
DECLARED MEDICATIONS: ABNORMAL
DIETHYLPROPION SERPLBLD CFM-MCNC: NEGATIVE NG/ML
DRUGS FLD: POSITIVE
EPHEDRIN SERPLBLD CFM-MCNC: NEGATIVE NG/ML
ETHANOL BLD-MCNC: NEGATIVE GM/DL
FENTANYL IA: NEGATIVE NG/ML
GABAPENTIN IA: NEGATIVE UG/ML
MDA SERPL-MCNC: NEGATIVE NG/ML
MDEA SERPL-MCNC: NEGATIVE NG/ML
MDMA SERPL-MCNC: NEGATIVE NG/ML
MEPERIDINE SERPLBLD-MCNC: NEGATIVE NG/ML
METHADONE SAL CFM-MCNC: NEGATIVE NG/ML
METHAMPHET SERPL-MCNC: NEGATIVE NG/ML
OPIATES SPEC-MCNC: ABNORMAL NG/ML
OXYCODONE SERPLBLD SCN-MCNC: NEGATIVE NG/ML
PCP SPEC-MCNC: NEGATIVE NG/ML
PHENDIMETRAZINE SERPLBLD CFM-MCNC: NEGATIVE NG/ML
PHENTERMINE SERPLBLD CFM-MCNC: NEGATIVE NG/ML
PPA SERPLBLD CFM-MCNC: NEGATIVE NG/ML
PROPOXYPH SPEC-MCNC: NEGATIVE NG/ML
PSEUDOEPHEDRINE SERPLBLD CFM-MCNC: NEGATIVE NG/ML
SYMPATHOMIMETICS SERPLBLD QL CFM: POSITIVE
TRAMADOL BLD-MCNC: NEGATIVE NG/ML

## 2023-05-01 NOTE — PROGRESS NOTES
Patient suctioned and electively terminally extubated per physician order. Placed on room air, breath sounds were diminished, labs pending. Patient tolerated procedure well without any immediate complications.    Teodoro Casillas, RT, RT  7/26/2021 3:23 PM   Clofazimine Counseling:  I discussed with the patient the risks of clofazimine including but not limited to skin and eye pigmentation, liver damage, nausea/vomiting, gastrointestinal bleeding and allergy.

## 2023-10-05 NOTE — PROGRESS NOTES
1. Adrenal insufficiency (H24)   I do not have records from [place], though she is out of medication.  Today, I will refill her medication and get labs.  I have advised that she reestablish with endocrinology,last saw Dr. Ferguson in 2018 for ongoing management of meds.  She is agreeable  - Comprehensive metabolic panel (BMP + Alb, Alk Phos, ALT, AST, Total. Bili, TP); Future  - Cortisol; Future  - Adrenal corticotropin; Future  - hydrocortisone (CORTEF) 10 MG tablet; Take one  tablet daily  Dispense: 90 tablet; Refill: 1  - Adult Endocrinology  Referral; Future  - Comprehensive metabolic panel (BMP + Alb, Alk Phos, ALT, AST, Total. Bili, TP)  - Cortisol  - Adrenal corticotropin    2. Panhypopituitarism (H24)  - levothyroxine (SYNTHROID/LEVOTHROID) 75 MCG tablet; Take 1 tablet (75 mcg) by mouth daily  Dispense: 90 tablet; Refill: 1  - Adult Endocrinology  Referral; Future    3. Hypothyroidism, unspecified type  - TSH; Future  - T4, free; Future  - levothyroxine (SYNTHROID/LEVOTHROID) 75 MCG tablet; Take 1 tablet (75 mcg) by mouth daily  Dispense: 90 tablet; Refill: 1  - Adult Endocrinology  Referral; Future  - TSH  - T4, free    4. Hypertension goal BP (blood pressure) < 130/80  - losartan (COZAAR) 50 MG tablet; Take 1 tablet (50 mg) by mouth daily  Dispense: 90 tablet; Refill: 1  - potassium chloride ER (K-TAB) 20 MEQ CR tablet; Take 20 mEq daily  Dispense: 90 tablet; Refill: 0    5. Hyperlipidemia LDL goal <100    - Lipid panel reflex to direct LDL Non-fasting; Future  - Lipid panel reflex to direct LDL Non-fasting    6. Visit for screening mammogram  - MA SCREENING DIGITAL BILAT - Future  (s+30); Future      7. Heart murmur  Noted on exam today.  She reports that this has been present for many years and was evaluated when she was much younger and she was told no follow up was needed.  Will defer workup for now.        Mirela Kang is a 56 year old, presenting for the following      ECMO Shift Summary: 7363-0610      ECMO Equipment:  Console serial number: [id number]  Circuit Lot number: [id number]  Oxygenator Lot number: [id number]    Patient remains on VA ECMO, all equipment is functioning and alarms are appropriately set. RPM's 3000 with flow range 1.72-3.01 L/min. Sweep gas is at 3 LPM and FiO2 50%. Circuit remains free of air, clot and fibrin. Cannulas are secure with extensive bleeding from site. Large hematoma on CT this am. Extremities are cold and mottled.     Significant Shift Events:   - hard to achieve a flow > 2 since 1000 despite fluid boluses this am and 2 units of blood. MDs aware.   - Continuous NS at 150 ml/hr through circuit and 600 ml per hour via IV.   - SVO2 undetectable at times - MD aware - FIO2 increased to 100% with some improvement to 50 but Pa02 was 500. Sats undetectable   - maxed out on levophed, epinephrine, and vasopressin by noon. Lars added  - right groin is oozing blood continuously - large hematoma  - Family came this afternoon  - ECHO and abdominal CT done this am  - loss of access in left groin - bicarb now running through the circuit    - went to [facility] at 1430 - replaced 15 fr arterial cannula with a 17 Fr long. Distal reperfusion catheter also changed. 250 albumin and 180 of NS given in CCL to get flows to 3  - pressors able to be weaned down after this.    Vent settings:  Ventilation Mode: PCV Plus assist  (Pressure Control Ventilation/ Assist Control)  FiO2 (%): 60 %  Rate Set (breaths/minute): 10 breaths/min  PEEP (cm H2O): 10 cmH2O  Oxygen Concentration (%): 50 %  Peak Inspiratory Pressure (cm H2O) (Drager Muna): 30  Resp: 10  .    Heparin is off due to bleeding.    Urine output is /hr.    Severe blood loss from right groin.  Product given included: Cryo 2                                              Platelet 1            PRBC 6                                             LR 1                                             NS 4+                                              Albumin 500      Results for EMILIA SHELTON (MRN [id number]) as of 7/25/2021 17:02   Ref. Range 7/25/2021 16:06   WBC Latest Ref Range: 4.0 - 11.0 10e3/uL 9.2   Hemoglobin Latest Ref Range: 11.7 - 15.7 g/dL 6.2 (LL)   Hematocrit Latest Ref Range: 35.0 - 47.0 % 19.0 (L)   Platelet Count Latest Ref Range: 150 - 450 10e3/uL 35 (LL)   RBC Count Latest Ref Range: 3.80 - 5.20 10e6/uL 2.17 (L)   MCV Latest Ref Range: 78 - 100 fL 88   MCH Latest Ref Range: 26.5 - 33.0 pg 28.6   MCHC Latest Ref Range: 31.5 - 36.5 g/dL 32.6   RDW Latest Ref Range: 10.0 - 15.0 % 17.1 (H)     Results for EMILIA SHELTON (MRN [id number]) as of 7/25/2021 17:02   Ref. Range 7/25/2021 16:05   pH Arterial Latest Ref Range: 7.35 - 7.45  7.22 (L)   pCO2 Arterial Latest Ref Range: 35 - 45 mm Hg 40   PO2 Arterial Latest Ref Range: 80 - 105 mm Hg 302 (H)   Bicarbonate Arterial Latest Ref Range: 21 - 28 mmol/L 16 (L)   Base Excess Art Latest Ref Range: -9.0 - 1.8 mmol/L -10.6 (L)   FIO2 Unknown 60   Oxyhemoglobin Arterial Latest Ref Range: 92 - 100 % 98       Plan is to continue VA ECMO.  Trend labs  Flow >3      Kaye Barone RN  ECMO Specialist  7/25/2021 1800               "health issues:  Recheck Medication        10/5/2023    10:00 AM   Additional Questions   Roomed by patsy Barrientos present to the clinic to reestablish care.  She recently moved back to [place] from [place] where she has been living for 5 years.  Her last visit here was in 2018.  She has been following with endocrinology in [place] for Panhypopituitarism. She is taking levothyroxine 75 mcg and Hydrocortisone 10 mg daily.  She is tolerating these doses well.          Review of Systems   Constitutional, HEENT, cardiovascular, pulmonary, GI, , musculoskeletal, neuro, skin, endocrine and psych systems are negative, except as otherwise noted.      Objective    BP (!) 142/86   Pulse 69   Temp 98.4  F (36.9  C) (Tympanic)   Resp 14   Ht 1.651 m (5' 5\")   Wt 65.6 kg (144 lb 9.6 oz)   SpO2 100%   BMI 24.06 kg/m    Body mass index is 24.06 kg/m .  Physical Exam   GENERAL: healthy, alert and no distress  RESP: lungs clear to auscultation - no rales, rhonchi or wheezes  CV: regular rate and rhythm, normal S1 S2, no S3 or S4, 3/6 systolic murmur noted on exam click or rub, no peripheral edema and peripheral pulses strong  PSYCH: mentation appears normal, affect normal/bright                Answers submitted by the patient for this visit:  General Questionnaire (Submitted on 10/5/2023)  Chief Complaint: Chronic problems general questions HPI Form  What is the reason for your visit today? : falow up midicion  How many servings of fruits and vegetables do you eat daily?: 0-1  On average, how many sweetened beverages do you drink each day (Examples: soda, juice, sweet tea, etc.  Do NOT count diet or artificially sweetened beverages)?: 2  How many days a week do you exercise enough to make your heart beat faster?: 3 or less  How many days per week do you miss taking your medication?: 1    "

## (undated) DEVICE — 8FR X 24CM SUPER ARROW-FLEX PERCUTANEOUS SHEATH INTRODUCER SET WITH INTEGRAL HEMOSTASIS VALVE/SIDE PORT AND RADIOPAQUE TIP MARKER BAND

## (undated) DEVICE — GUIDEWIRE VASC 0.014INX180CM RUNTHROUGH 25-1011

## (undated) DEVICE — LEFT HEART PK FAIRVIEW UNIV MEDICAL CENTER

## (undated) DEVICE — INTRO SHEATH MICRO PLATINUM TIP 4FRX40CM 7274

## (undated) DEVICE — KIT START UP COOLGARD STD TUBING 6FT

## (undated) DEVICE — KIT CATHETER INDIGO RX 140CM WITH LG LUMEN ASP TUBING

## (undated) DEVICE — 17FR 23CM ARTERIAL ECMO CANNULA WITH BIOLINE COATING

## (undated) DEVICE — GUIDEWIRE VASCULAR 0.035IN DIA 145CML 15CML/6CML STAINLESS

## (undated) DEVICE — CATH BALLOON NC EMERGE 4.00X30MM H7493926730400

## (undated) DEVICE — 15FR 23CM ECMO ARTERIAL ECMO CANNULA WITH BIOLINE COATING

## (undated) DEVICE — CANISTER ENGINE FOR INDIGO SYSTEM

## (undated) DEVICE — CATH BALLOON EMERGE 2.5X15MM H7493918915250

## (undated) DEVICE — KIT ACCESSORY INTRO INFLATION SYS 20/30 PRIORITY 1000186-115

## (undated) DEVICE — CATH BALLOON NC EMERGE 4.00X15MM H7493926715400

## (undated) DEVICE — WIRE GUIDE 0.035"X145CM AMPLATZ XSTIFF J THSCF-35-145-3-AES

## (undated) DEVICE — CATH QUATTRO 9.3FR  FEM INSTRN

## (undated) DEVICE — CATH BALLOON NC EMERGE 3.50X20MM H7493926720350

## (undated) DEVICE — Device

## (undated) DEVICE — CATH ANGIO SUPERTORQUE PLUS JL4 6FRX100CM 533620

## (undated) DEVICE — VALVE HEMOSTASIS .096" COPILOT MECH 1003331

## (undated) DEVICE — CATH ANGIO INFINITI 3DRC 6FRX100CM 534676T

## (undated) DEVICE — CATH BALLOON NC EMERGE 4.50X20MM H7493926720450

## (undated) DEVICE — CATH GUIDING BLUE YELLOW PTFE JR4 6FRX100CM 67008200

## (undated) DEVICE — 8FR X 11CM SUPER ARROW-FLEX PERCUTANEOUS SHEATH INTRODUCER SET WITH INTEGRAL HEMOSTASIS VALVE/SIDE PORT AND RADIOPAQUE TIP MARKER BAND

## (undated) DEVICE — CANNULA VENOUS 25FR LONG

## (undated) DEVICE — KIT ARTERIAL LINE 2GA 12CM INTRO NDL ASK-04510-HF

## (undated) RX ORDER — ASPIRIN 325 MG
TABLET ORAL
Status: DISPENSED
Start: 2021-07-24

## (undated) RX ORDER — HEPARIN SODIUM 1000 [USP'U]/ML
INJECTION, SOLUTION INTRAVENOUS; SUBCUTANEOUS
Status: DISPENSED
Start: 2021-07-24

## (undated) RX ORDER — ALBUMIN, HUMAN INJ 5% 5 %
SOLUTION INTRAVENOUS
Status: DISPENSED
Start: 2021-07-25

## (undated) RX ORDER — ALBUMIN, HUMAN INJ 5% 5 %
SOLUTION INTRAVENOUS
Status: DISPENSED
Start: 2021-07-24

## (undated) RX ORDER — NOREPINEPHRINE BITARTRATE 0.06 MG/ML
INJECTION, SOLUTION INTRAVENOUS
Status: DISPENSED
Start: 2021-07-24

## (undated) RX ORDER — MIDAZOLAM HCL IN 0.9 % NACL/PF 1 MG/ML
PLASTIC BAG, INJECTION (ML) INTRAVENOUS
Status: DISPENSED
Start: 2021-07-24